# Patient Record
Sex: MALE | Race: WHITE | NOT HISPANIC OR LATINO | URBAN - METROPOLITAN AREA
[De-identification: names, ages, dates, MRNs, and addresses within clinical notes are randomized per-mention and may not be internally consistent; named-entity substitution may affect disease eponyms.]

---

## 2019-04-12 ENCOUNTER — HOSPITAL ENCOUNTER (OUTPATIENT)
Dept: SLEEP MEDICINE | Facility: HOSPITAL | Age: 44
Discharge: HOME | End: 2019-04-12
Attending: INTERNAL MEDICINE

## 2020-06-08 ENCOUNTER — OFFICE VISIT CONVERTED (OUTPATIENT)
Dept: OTOLARYNGOLOGY | Facility: CLINIC | Age: 45
End: 2020-06-08
Attending: OTOLARYNGOLOGY

## 2020-07-07 ENCOUNTER — HOSPITAL ENCOUNTER (OUTPATIENT)
Dept: PREADMISSION TESTING | Facility: HOSPITAL | Age: 45
Discharge: HOME | End: 2020-07-07
Attending: OTOLARYNGOLOGY

## 2020-07-08 LAB — SARS-COV-2 RNA SPEC QL NAA+PROBE: NOT DETECTED

## 2020-07-10 ENCOUNTER — HOSPITAL ENCOUNTER (OUTPATIENT)
Dept: PERIOP | Facility: HOSPITAL | Age: 45
Setting detail: HOSPITAL OUTPATIENT SURGERY
Discharge: HOME | End: 2020-07-10
Attending: OTOLARYNGOLOGY

## 2020-07-17 ENCOUNTER — OFFICE VISIT CONVERTED (OUTPATIENT)
Dept: OTOLARYNGOLOGY | Facility: CLINIC | Age: 45
End: 2020-07-17
Attending: OTOLARYNGOLOGY

## 2020-07-24 ENCOUNTER — HOSPITAL ENCOUNTER (OUTPATIENT)
Dept: SLEEP MEDICINE | Facility: HOSPITAL | Age: 45
Discharge: HOME | End: 2020-07-24
Attending: INTERNAL MEDICINE

## 2021-01-05 ENCOUNTER — HOSPITAL ENCOUNTER (OUTPATIENT)
Dept: GENERAL RADIOLOGY | Facility: HOSPITAL | Age: 46
Discharge: HOME | End: 2021-01-05

## 2021-03-11 ENCOUNTER — HOSPITAL ENCOUNTER (OUTPATIENT)
Dept: OTHER | Facility: HOSPITAL | Age: 46
Discharge: HOME | End: 2021-03-11
Attending: FAMILY MEDICINE

## 2021-05-10 NOTE — H&P
"   History and Physical      Patient Name: Castillo Barbour   Patient ID: [id number]   Sex: Male   YOB: 1975    Primary Care Provider: Will Zimmerman MD   Referring Provider: Will Zimmerman MD    Visit Date: June 8, 2020    Provider: Hardik Kang MD   Location: [facility]   Location Address: [address] [place] [address], [address] [place] [address]  [id number]   Location Phone: [phone]          Chief Complaint     \"I have trouble breathing through my nose.\"       History Of Present Illness  Castillo Barbour is a 44 year old /White male with past medical history significant for obstructive sleep apnea syndrome on CPAP and previous DVT/PE on Eliquis who presents to the office today as a consult from Will Zimmerman MD for evaluation of his nose. He tells me that he has had issues breathing through both sides of his nose for years. It seems to be worse in the mornings and sometimes improves throughout the day. It is occasionally quite bothersome when he has tried to use his CPAP with nasal pillows. He also reports bilateral clear rhinorrhea for which he had tried Flonase and Nasonex in the past without any provement. He is currently using ipratropium bromide nasal spray twice daily and finds this helpful. He denies any issues with epistaxis, diminished sense of smell, facial pain/pressure, previous nasal trauma, or previous nasal surgery. He believes he averages around 2-3 sinus infections annually. He has not undergone allergy testing.       Past Medical History  Closed fracture of distal end of left radius with routine healing, unspecified fracture morphology, subsequent encounter; Left wrist pain         Past Surgical History  Thrombolytic therapy for deep vein thrombosis (DVT)         Medication List  Ambien 10 mg oral tablet; Atrovent HFA 17 mcg/actuation inhalation HFA aerosol inhaler; Dexilant 60 mg oral capsule,biphase delayed releas; Eliquis 5 mg oral tablet; hydroxyzine HCl 10 " "mg oral tablet         Allergy List  erythromycin         Family Medical History  *No Known Family History         Social History  Tobacco (Never)         Review of Systems  · Constitutional  o Denies  o : fever, night sweats, weight loss  · Eyes  o Denies  o : discharge from eye, impaired vision  · HENT  o Admits  o : *See HPI  · Cardiovascular  o Denies  o : chest pain, irregular heart beats  · Respiratory  o Denies  o : shortness of breath, wheezing, coughing up blood  · Gastrointestinal  o Admits  o : heartburn, reflux  o Denies  o : vomiting blood  · Genitourinary  o Denies  o : frequency  · Integument  o Denies  o : rash, skin dryness  · Neurologic  o Denies  o : seizures, loss of balance, loss of consciousness, dizziness  · Endocrine  o Denies  o : cold intolerance, heat intolerance  · Heme-Lymph  o Denies  o : easy bleeding, anemia      Vitals  Date Time BP Position Site L\R Cuff Size HR RR TEMP (F) WT  HT  BMI kg/m2 BSA m2 O2 Sat HC       06/08/2020 11:09 AM        97.9 287lbs 8oz 6'  3\" 35.93 2.63           Physical Examination  · Constitutional  o Appearance  o : well developed, well-nourished, alert and in no acute distress, voice clear and strong  · Head and Face  o Head  o :   § Inspection  § : no deformities or lesions  o Face  o :   § Inspection  § : No facial lesions; House-Brackmann I/VI bilaterally  § Palpation  § : No TMJ crepitus nor  muscle tenderness bilaterally  · Eyes  o Vision  o :   § Visual Fields  § : Extraocular movements are intact. No spontaneous or gaze-induced nystagmus.  o Conjunctivae  o : clear  o Sclerae  o : clear  o Pupils and Irises  o : pupils equal, round, and reactive to light.   · Ears, Nose, Mouth and Throat  o Ears  o :   § External Ears  § : appearance within normal limits, no lesions present  § Otoscopic Examination  § : tympanic membrane appearance within normal limits bilaterally without perforations, well-aerated middle ears  § Hearing  § : intact to " conversational voice both ears  o Nose  o :   § External Nose  § : appearance normal  § Intranasal Exam  § : mucosa within normal limits, vestibules normal, no intranasal lesions present, septum deviated to the left, bilateral inferior turbinates hypertrophy, sinuses non tender to percussion  o Oral Cavity  o :   § Oral Mucosa  § : oral mucosa normal without pallor or cyanosis  § Lips  § : lip appearance normal  § Teeth  § : normal dentition for age  § Gums  § : gums pink, non-swollen, no bleeding present  § Tongue  § : tongue appearance normal; normal mobility  § Palate  § : hard palate normal, soft palate appearance normal with symmetric mobility  o Throat  o :   § Oropharynx  § : no inflammation or lesions present, tonsils within normal limits  § Hypopharynx  § : Deferred secondary gag reflex  § Larynx  § : Deferred secondary to gag reflex  · Neck  o Inspection/Palpation  o : normal appearance, no masses or tenderness, trachea midline; thyroid size normal, nontender, no nodules or masses present on palpation  · Respiratory  o Respiratory Effort  o : breathing unlabored  o Inspection of Chest  o : normal appearance, no retractions  · Cardiovascular  o Heart  o : regular rate and rhythm  · Lymphatic  o Neck  o : no lymphadenopathy present  o Supraclavicular Nodes  o : no lymphadenopathy present  o Preauricular Nodes  o : no lymphadenopathy present  · Skin and Subcutaneous Tissue  o General Inspection  o : Regarding face and neck - there are no rashes present, no lesions present, and no areas of discoloration  · Neurologic  o Cranial Nerves  o : cranial nerves II-XII are grossly intact bilaterally  o Gait and Station  o : normal gait, able to stand without diffculty  · Psychiatric  o Judgement and Insight  o : judgment and insight intact  o Mood and Affect  o : mood normal, affect appropriate          Assessment  · Pre-Surgical Orders     V72.84/Z01.818  · Deviated nasal septum     470/J34.2  · Nasal  obstruction     478.19/J34.89  · Hypertrophy of both inferior nasal turbinates     478.0/J34.3    Problems Reconciled  Plan  · Orders  o General Surgery Order (GENOR) - V72.84/Z01.818 - 06/08/2020  o Septoplasty with reduction of turbinates on both sides of nose (87512, 57745) - V72.84/Z01.818, 470/J34.2, 478.19/J34.89, 478.0/J34.3 - 06/08/2020  · Instructions  o PLAN:   o Handouts Provided-Pre-Procedure Instructions including date and time and location of procedure.  o ****Surgical Orders****  o ****Patient Status****  o Outpatient  o ********************  o RISK AND BENEFITS:  o Consent for surgery: Given these options, the patient has verbally expressed an understanding of the risks of surgery and finds these risks acceptable. We will proceed with surgery as soon as possible.  o Consult Anesthesia for a post-operative block, or any pain management procedure deemed necessary by the anesthesiologist for adequate post-operative pain control.   o O.R. PREP: Per protocol  o IV: Per Anesthesia  o PLEASE SIGN PERMIT FOR: Nasal septoplasty with submucous resection of the bilateral inferior nasal turbinates  o PRE- OP MEDICATION ORDER:  o *__Kefzol 2 gram IV on call to OR.  o *___The above History and Physical Examination has been completed within 30 days of admission.  o Impressions and findings were discussed Mr. Km dao. Currently, he is seen for evaluation bilateral nasal congestion which has been problematic for years. He also reports frequent clear rhinorrhea which is controlled with ipratropium bromide nasal spray and is worse in the spring. Examination today reveals a left septal deviation and hypertrophy of both inferior nasal turbinates. We discussed the pathophysiology and natural history of this condition. Options for management were discussed including continued medical management with a combination of steroid and antihistamine nasal sprays versus septoplasty with inferior turbinate reduction versus a  referral for allergy testing and immunotherapy. The risks, benefits, and alternatives were discussed. The risks include septal perforation, epistaxis, infection, recurrence of congestion, nasal scarring, altered appearance, or CSF leak. He would like to pursue septoplasty and submucous resection of the bilateral inferior nasal turbinates. He was given ample time to ask questions, all of which were answered the best my ability.  · Correspondence  o ENT Letter to Referring MD (Will Zimmerman MD) - 06/08/2020            Electronically Signed by: Hardik Kang MD -Author on June 8, 2020 11:38:23 AM

## 2021-05-13 NOTE — PROGRESS NOTES
"   Progress Note      Patient Name: Castillo Barbour   Patient ID: [id number]   Sex: Male   YOB: 1975    Primary Care Provider: Will Zimmerman MD   Referring Provider: Will Zimmerman MD    Visit Date: July 17, 2020    Provider: Hardik Kang MD   Location: [facility] Th[facility]   Location Address: [address] [place] [address], [address] [place] [address]  [id number]   Location Phone: [phone]          Chief Complaint     \"I am ready to get these out.\"       History Of Present Illness  Castillo Barbour is a 44 year old /White male who returns today for follow-up and splint removal status post nasal septoplasty with submucous resection of the bilateral inferior nasal turbinates on 7/10/2020. He tells me that he has had difficulty sleeping because of his nasal obstruction over the last week. He has not had any bleeding since second day after surgery. He does report that his nasal columella is quite sore to the touch.       Past Medical History  BPH (benign prostatic hypertrophy); Chronic sinusitis; Closed fracture of distal end of left radius with routine healing, unspecified fracture morphology, subsequent encounter; Deviated nasal septum; GERD (gastroesophageal reflux disease); Hypertrophy of both inferior nasal turbinates; Left wrist pain; Nasal obstruction; Sleep apnea         Past Surgical History  Appendectomy; Nasal septoplasty; Submucosal resection of both inferior turbinates         Medication List  Ambien 10 mg oral tablet; Atrovent HFA 17 mcg/actuation inhalation HFA aerosol inhaler; Eliquis 5 mg oral tablet; Flomax 0.4 mg oral capsule; naproxen 500 mg oral tablet         Allergy List  erythromycin         Family Medical History  Family history of heart disease         Social History  Marijuana (Former); Tobacco (Never)         Review of Systems  · Constitutional  o Denies  o : fever, night sweats, weight loss  · Eyes  o Denies  o : discharge from eye, impaired vision  · HENT  o Admits  o : " "*See HPI  · Cardiovascular  o Denies  o : chest pain, irregular heart beats  · Respiratory  o Denies  o : shortness of breath, wheezing, coughing up blood  · Gastrointestinal  o Denies  o : heartburn, reflux, vomiting blood  · Genitourinary  o Denies  o : frequency  · Integument  o Denies  o : rash, skin dryness  · Neurologic  o Denies  o : seizures, loss of balance, loss of consciousness, dizziness  · Endocrine  o Denies  o : cold intolerance, heat intolerance  · Heme-Lymph  o Denies  o : easy bleeding, anemia      Vitals  Date Time BP Position Site L\R Cuff Size HR RR TEMP (F) WT  HT  BMI kg/m2 BSA m2 O2 Sat HC       07/17/2020 01:14 PM        97.5 284lbs 8oz 6'  3\" 35.56 2.61           Physical Examination  · Constitutional  o Appearance  o : well developed, well-nourished, alert and in no acute distress, voice clear and strong  · Head and Face  o Head  o :   § Inspection  § : no deformities or lesions  o Face  o :   § Inspection  § : No facial lesions; House-Brackmann I/VI bilaterally  § Palpation  § : No TMJ crepitus nor  muscle tenderness bilaterally  · Eyes  o Vision  o :   § Visual Fields  § : Extraocular movements are intact. No spontaneous or gaze-induced nystagmus.  o Conjunctivae  o : clear  o Sclerae  o : clear  o Pupils and Irises  o : pupils equal, round, and reactive to light.   · Ears, Nose, Mouth and Throat  o Ears  o :   § External Ears  § : appearance within normal limits, no lesions present  § Otoscopic Examination  § : tympanic membrane appearance within normal limits bilaterally without perforations, well-aerated middle ears  § Hearing  § : intact to conversational voice both ears  o Nose  o :   § External Nose  § : appearance normal  § Intranasal Exam  § : Splints were removed. Mucosa slightly edematous, vestibules normal, no intranasal lesions present, septum midline, crusting to the anterior portions of the inferior turbinates bilaterally, sinuses non tender to percussion  o Oral " Cavity  o :   § Oral Mucosa  § : oral mucosa normal without pallor or cyanosis  § Lips  § : lip appearance normal  § Teeth  § : normal dentition for age  § Gums  § : gums pink, non-swollen, no bleeding present  § Tongue  § : tongue appearance normal; normal mobility  § Palate  § : hard palate normal, soft palate appearance normal with symmetric mobility  o Throat  o :   § Oropharynx  § : no inflammation or lesions present, tonsils within normal limits  · Neck  o Inspection/Palpation  o : normal appearance, no masses or tenderness, trachea midline; thyroid size normal, nontender, no nodules or masses present on palpation  · Respiratory  o Respiratory Effort  o : breathing unlabored  · Lymphatic  o Neck  o : no lymphadenopathy present  o Supraclavicular Nodes  o : no lymphadenopathy present  o Preauricular Nodes  o : no lymphadenopathy present  · Skin and Subcutaneous Tissue  o General Inspection  o : Regarding face and neck - there are no rashes present, no lesions present, and no areas of discoloration  · Neurologic  o Cranial Nerves  o : cranial nerves II-XII are grossly intact bilaterally  o Gait and Station  o : normal gait, able to stand without diffculty  · Psychiatric  o Judgement and Insight  o : judgment and insight intact  o Mood and Affect  o : mood normal, affect appropriate          Assessment  · Deviated nasal septum     470/J34.2  · Nasal obstruction     478.19/J34.89  · Hypertrophy of both inferior nasal turbinates     478.0/J34.3    Problems Reconciled  Plan  · Medications  o Medications have been Reconciled  o Transition of Care or Provider Policy  · Instructions  o Impressions and findings were discussed Mr. Barbour at great length. Currently, he is healing well and we discussed continued postoperative care. He will follow-up in 6 to 8 weeks or sooner if needed.            Electronically Signed by: Hardik Kang MD -Author on July 17, 2020 01:36:56 PM

## 2021-05-15 VITALS — BODY MASS INDEX: 35.37 KG/M2 | HEIGHT: 75 IN | WEIGHT: 284.5 LBS | TEMPERATURE: 97.5 F

## 2021-05-15 VITALS — BODY MASS INDEX: 35.75 KG/M2 | WEIGHT: 287.5 LBS | HEIGHT: 75 IN | TEMPERATURE: 97.9 F

## 2023-10-02 ENCOUNTER — OFFICE VISIT (OUTPATIENT)
Dept: PODIATRY | Facility: CLINIC | Age: 48
End: 2023-10-02
Payer: COMMERCIAL

## 2023-10-02 VITALS
TEMPERATURE: 97.3 F | WEIGHT: 222 LBS | OXYGEN SATURATION: 96 % | HEIGHT: 75 IN | SYSTOLIC BLOOD PRESSURE: 113 MMHG | HEART RATE: 58 BPM | DIASTOLIC BLOOD PRESSURE: 70 MMHG | BODY MASS INDEX: 27.6 KG/M2

## 2023-10-02 DIAGNOSIS — M79.671 RIGHT FOOT PAIN: Primary | ICD-10-CM

## 2023-10-02 DIAGNOSIS — B07.0 PLANTAR WARTS: ICD-10-CM

## 2023-10-02 PROCEDURE — 17110 DESTRUCTION B9 LES UP TO 14: CPT | Performed by: PODIATRIST

## 2023-10-02 PROCEDURE — 99202 OFFICE O/P NEW SF 15 MIN: CPT | Performed by: PODIATRIST

## 2023-10-02 RX ORDER — RIVAROXABAN 15 MG/1
TABLET, FILM COATED ORAL
COMMUNITY
Start: 2023-09-29

## 2023-10-02 RX ORDER — ZOLPIDEM TARTRATE 10 MG/1
10 TABLET ORAL
COMMUNITY

## 2023-10-02 RX ORDER — IPRATROPIUM BROMIDE 17 UG/1
AEROSOL, METERED RESPIRATORY (INHALATION)
COMMUNITY

## 2023-10-02 RX ORDER — NAPROXEN 500 MG/1
TABLET ORAL
COMMUNITY

## 2023-10-02 RX ORDER — TAMSULOSIN HYDROCHLORIDE 0.4 MG/1
0.4 CAPSULE ORAL DAILY
COMMUNITY

## 2023-10-02 RX ORDER — CYCLOBENZAPRINE HCL 10 MG
10 TABLET ORAL
COMMUNITY

## 2023-10-02 RX ORDER — METOPROLOL SUCCINATE 25 MG/1
25 TABLET, EXTENDED RELEASE ORAL DAILY
COMMUNITY

## 2023-10-03 NOTE — PROGRESS NOTES
[facility] - PODIATRY    Today's Date: 10/03/23    Patient Name: Castillo Barbour  MRN: [id number]  CSN: [id number]  PCP: Will Zimmerman MD,   Referring Provider: Will Zimmerman MD    SUBJECTIVE     Chief Complaint   Patient presents with    Right Foot - Plantar Warts, Pain     Has been present for years GP excised one last week  states referred here to see if anything else needed to be done and to look at the rest     HPI: Castillo Barbour, a 48 y.o.male, presents to clinic.    Patient is a 48-year-old male presenting with painful right foot lesions.  They have gone for several months.  Has had one of them removed but it has come back.  He is here for further treatment.  Past Medical History:   Diagnosis Date    DVT (deep venous thrombosis)     Enlarged prostate     Hypertension     Pulmonary emboli      Past Surgical History:   Procedure Laterality Date    APPENDECTOMY      GASTRIC BANDING      SINUS SURGERY       History reviewed. No pertinent family history.  Social History     Socioeconomic History    Marital status:    Substance and Sexual Activity    Alcohol use: Yes    Drug use: Yes     Types: Marijuana     Comment: THC gummies    Sexual activity: Defer     Allergies   Allergen Reactions    Erythromycin Rash     Current Outpatient Medications   Medication Sig Dispense Refill    cyclobenzaprine (FLEXERIL) 10 MG tablet Take 1 tablet by mouth.      ipratropium (Atrovent HFA) 17 MCG/ACT inhaler Atrovent HFA 17 mcg/actuation inhalation HFA aerosol inhaler inhale 2 puffs (34 mcg) by inhalation route 4 times per day   Active      metoprolol succinate XL (TOPROL-XL) 25 MG 24 hr tablet Take 1 tablet by mouth Daily.      naproxen (NAPROSYN) 500 MG tablet naproxen 500 mg oral tablet take 1 tablet by oral route daily   Active      tamsulosin (FLOMAX) 0.4 MG capsule 24 hr capsule Take 1 capsule by mouth Daily.      Xarelto 15 MG tablet       zolpidem (AMBIEN) 10 MG tablet Take 1 tablet by mouth.        No current facility-administered medications for this visit.     Review of Systems    OBJECTIVE     Vitals:    10/02/23 1520   BP: 113/70   Pulse: 58   Temp: 97.3 °F (36.3 °C)   SpO2: 96%       WBC   Date Value Ref Range Status   06/16/2021 8.81 4.5 - 11.0 10*3/uL Final     RBC   Date Value Ref Range Status   06/16/2021 4.89 4.5 - 5.9 10*6/uL Final     Hemoglobin   Date Value Ref Range Status   06/16/2021 14.5 13.5 - 17.5 g/dL Final     Hematocrit   Date Value Ref Range Status   06/16/2021 42.0 41.0 - 53.0 % Final     MCV   Date Value Ref Range Status   06/16/2021 85.9 80.0 - 100.0 fL Final     MCH   Date Value Ref Range Status   06/16/2021 29.7 26.0 - 34.0 pg Final     MCHC   Date Value Ref Range Status   06/16/2021 34.5 31.0 - 37.0 g/dL Final     RDW   Date Value Ref Range Status   06/16/2021 12.5 12.0 - 16.8 % Final     RDW-SD   Date Value Ref Range Status   07/20/2020 38.0 35.1 - 43.9 fL Final     MPV   Date Value Ref Range Status   06/16/2021 9.8 8.4 - 12.4 fL Final     Platelets   Date Value Ref Range Status   06/16/2021 172 140 - 440 10*3/uL Final     Neutrophil Rel %   Date Value Ref Range Status   06/16/2021 82.0 (H) 45 - 80 % Final     Lymphocyte Rel %   Date Value Ref Range Status   06/16/2021 12.1 (L) 15 - 50 % Final     Monocyte Rel %   Date Value Ref Range Status   06/16/2021 5.4 0 - 15 % Final     Eosinophil %   Date Value Ref Range Status   06/16/2021 0.0 0 - 7 % Final     Basophil Rel %   Date Value Ref Range Status   06/16/2021 0.3 0 - 2 % Final     Immature Grans %   Date Value Ref Range Status   06/16/2021 0.2 0.0 - 1.0 % Final     Neutrophils Absolute   Date Value Ref Range Status   06/16/2021 7.21 2.0 - 8.8 10*3/uL Final     Lymphocytes Absolute   Date Value Ref Range Status   06/16/2021 1.07 0.7 - 5.5 10*3/uL Final     Monocytes Absolute   Date Value Ref Range Status   06/16/2021 0.48 0.0 - 1.7 10*3/uL Final     Eosinophils Absolute   Date Value Ref Range Status   06/16/2021 0.00 0.0 - 0.8  10*3/uL Final     Basophils Absolute   Date Value Ref Range Status   06/16/2021 0.03 0.0 - 0.2 10*3/uL Final     Immature Grans, Absolute   Date Value Ref Range Status   06/16/2021 0.02 0.00 - 0.10 10*3/uL Final     nRBC   Date Value Ref Range Status   06/16/2021 0 0 /100(WBC) Final         Lab Results   Component Value Date    GLUCOSE 109 (H) 07/20/2020    BUN 18 07/20/2020    CREATININE 1.11 07/20/2020    BCR 16 07/20/2020    K 3.8 07/20/2020    CO2 25 07/20/2020    CALCIUM 9.3 07/20/2020    ALBUMIN 4.4 07/20/2020    LABIL2 1.5 07/20/2020    AST 21 07/20/2020    ALT 22 07/20/2020       Patient seen in no apparent distress.      PHYSICAL EXAM:     Foot/Ankle Exam    GENERAL  Appearance:  appears stated age  Orientation:  AAOx3  Affect:  appropriate  Gait:  unimpaired  Assistance:  independent  Right shoe gear: casual shoe  Left shoe gear: casual shoe    VASCULAR     Right Foot Vascularity   Normal vascular exam    Dorsalis pedis:  2+  Posterior tibial:  2+  Skin temperature:  warm  Edema grading:  None  CFT:  < 3 seconds  Pedal hair growth:  Present  Varicosities:  none     Left Foot Vascularity   Normal vascular exam    Dorsalis pedis:  2+  Posterior tibial:  2+  Skin temperature:  warm  Edema grading:  None  CFT:  < 3 seconds  Pedal hair growth:  Present  Varicosities:  none     NEUROLOGIC     Right Foot Neurologic   Normal sensation    Light touch sensation: normal  Vibratory sensation: normal  Hot/Cold sensation: normal  Protective Sensation using [place] Monofilament:   Sites intact: 10  Sites tested: 10     Left Foot Neurologic   Normal sensation    Light touch sensation: normal  Vibratory sensation: normal  Hot/Cold sensation:  normal  Protective Sensation using [place] Monofilament:   Sites intact: 10  Sites tested: 10    MUSCLE STRENGTH     Right Foot Muscle Strength   Foot dorsiflexion:  4  Foot plantar flexion:  4  Foot inversion:  4  Foot eversion:  4     Left Foot Muscle Strength    Foot dorsiflexion:  4  Foot plantar flexion:  4  Foot inversion:  4  Foot eversion:  4    RANGE OF MOTION     Right Foot Range of Motion   Foot and ankle ROM within normal limits       Left Foot Range of Motion   Foot and ankle ROM within normal limits      DERMATOLOGIC      Right Foot Dermatologic   Skin  Right foot skin is intact.      Left Foot Dermatologic   Skin  Left foot skin is intact.      Right foot additional comments: 3 lesions to plantar right foot with pain on lateral compression and pinpoint bleeding lesions measuring 0.4 x 0.4 cm lesions.      RADIOLOGY:          No results found.    ASSESSMENT/PLAN     Diagnoses and all orders for this visit:    1. Right foot pain (Primary)    2. Plantar warts      Verruca lesion areas were debrided with #15 scalpel blade down to pinpoint bleeding.  These areas are treated with applications of 89% phenol.  This was followed by irrigation with copious amounts of isopropyl alcohol.    Comprehensive lower extremity examination and evaluation was performed.    Discussed findings and treatment plan including risks, benefits, and treatment options with patient in detail. Patient agreed with treatment plan.    Medications and allergies reviewed.  Reviewed available lab values along with other pertinent labs.  These were discussed with the patient.    An After Visit Summary was printed and given to the patient at discharge, including (if requested) any available informative/educational handouts regarding diagnosis, treatment, or medications. All questions were answered to patient/family satisfaction. Should symptoms fail to improve or worsen they agree to call or return to clinic or to go to the Emergency Department. Discussed the importance of following up with any needed screening tests/labs/specialist appointments and any requested follow-up recommended by me today. Importance of maintaining follow-up discussed and patient accepts that missed appointments can delay  diagnosis and potentially lead to worsening of conditions.    Return in about 1 month (around 11/2/2023)., or sooner if acute issues arise.    This document has been electronically signed by Shar Puentes DPM on October 3, 2023 08:55 EDT

## 2023-11-03 ENCOUNTER — OFFICE VISIT (OUTPATIENT)
Dept: PODIATRY | Facility: CLINIC | Age: 48
End: 2023-11-03
Payer: COMMERCIAL

## 2023-11-03 VITALS
DIASTOLIC BLOOD PRESSURE: 82 MMHG | TEMPERATURE: 98.3 F | OXYGEN SATURATION: 97 % | HEART RATE: 60 BPM | BODY MASS INDEX: 27.87 KG/M2 | WEIGHT: 223 LBS | SYSTOLIC BLOOD PRESSURE: 134 MMHG

## 2023-11-03 DIAGNOSIS — B07.0 PLANTAR WARTS: Primary | ICD-10-CM

## 2023-11-03 DIAGNOSIS — M79.671 RIGHT FOOT PAIN: ICD-10-CM

## 2023-11-03 PROCEDURE — 17110 DESTRUCTION B9 LES UP TO 14: CPT | Performed by: PODIATRIST

## 2023-11-03 RX ORDER — IPRATROPIUM BROMIDE 42 UG/1
SPRAY, METERED NASAL
COMMUNITY
Start: 2023-11-02

## 2023-11-06 NOTE — PROGRESS NOTES
[facility] - PODIATRY    Today's Date: 11/06/23    Patient Name: Castillo Barbour  MRN: [id number]  CSN: [id number]  PCP: Will Zimmerman MD,   Referring Provider: No ref. provider found    SUBJECTIVE     Chief Complaint   Patient presents with    Right Foot - Follow-up     S/p removal of a plantar wart     HPI: Castillo Barbour, a 48 y.o.male, presents to clinic.    Patient is a 48-year-old male presenting with painful right foot lesions.  They have gone for several months.  Has had one of them removed but it has come back.  He is here for further treatment.    11/3/2023-patient is here for follow-up.  Warts are smaller.  Past Medical History:   Diagnosis Date    DVT (deep venous thrombosis)     Enlarged prostate     Hypertension     Pulmonary emboli      Past Surgical History:   Procedure Laterality Date    APPENDECTOMY      GASTRIC BANDING      SINUS SURGERY       History reviewed. No pertinent family history.  Social History     Socioeconomic History    Marital status:    Tobacco Use    Smoking status: Never   Substance and Sexual Activity    Alcohol use: Yes    Drug use: Yes     Types: Marijuana     Comment: THC gummies    Sexual activity: Defer     Allergies   Allergen Reactions    Erythromycin Rash     Current Outpatient Medications   Medication Sig Dispense Refill    cyclobenzaprine (FLEXERIL) 10 MG tablet Take 1 tablet by mouth.      ipratropium (Atrovent HFA) 17 MCG/ACT inhaler Atrovent HFA 17 mcg/actuation inhalation HFA aerosol inhaler inhale 2 puffs (34 mcg) by inhalation route 4 times per day   Active      ipratropium (ATROVENT) 0.06 % nasal spray       metoprolol succinate XL (TOPROL-XL) 25 MG 24 hr tablet Take 1 tablet by mouth Daily.      naproxen (NAPROSYN) 500 MG tablet naproxen 500 mg oral tablet take 1 tablet by oral route daily   Active      tamsulosin (FLOMAX) 0.4 MG capsule 24 hr capsule Take 1 capsule by mouth Daily.      Xarelto 15 MG tablet       zolpidem (AMBIEN) 10 MG  tablet Take 1 tablet by mouth.       No current facility-administered medications for this visit.     Review of Systems    OBJECTIVE     Vitals:    11/03/23 1514   BP: 134/82   Pulse: 60   Temp: 98.3 °F (36.8 °C)   SpO2: 97%       WBC   Date Value Ref Range Status   06/16/2021 8.81 4.5 - 11.0 10*3/uL Final     RBC   Date Value Ref Range Status   06/16/2021 4.89 4.5 - 5.9 10*6/uL Final     Hemoglobin   Date Value Ref Range Status   06/16/2021 14.5 13.5 - 17.5 g/dL Final     Hematocrit   Date Value Ref Range Status   06/16/2021 42.0 41.0 - 53.0 % Final     MCV   Date Value Ref Range Status   06/16/2021 85.9 80.0 - 100.0 fL Final     MCH   Date Value Ref Range Status   06/16/2021 29.7 26.0 - 34.0 pg Final     MCHC   Date Value Ref Range Status   06/16/2021 34.5 31.0 - 37.0 g/dL Final     RDW   Date Value Ref Range Status   06/16/2021 12.5 12.0 - 16.8 % Final     RDW-SD   Date Value Ref Range Status   07/20/2020 38.0 35.1 - 43.9 fL Final     MPV   Date Value Ref Range Status   06/16/2021 9.8 8.4 - 12.4 fL Final     Platelets   Date Value Ref Range Status   06/16/2021 172 140 - 440 10*3/uL Final     Neutrophil Rel %   Date Value Ref Range Status   06/16/2021 82.0 (H) 45 - 80 % Final     Lymphocyte Rel %   Date Value Ref Range Status   06/16/2021 12.1 (L) 15 - 50 % Final     Monocyte Rel %   Date Value Ref Range Status   06/16/2021 5.4 0 - 15 % Final     Eosinophil %   Date Value Ref Range Status   06/16/2021 0.0 0 - 7 % Final     Basophil Rel %   Date Value Ref Range Status   06/16/2021 0.3 0 - 2 % Final     Immature Grans %   Date Value Ref Range Status   06/16/2021 0.2 0.0 - 1.0 % Final     Neutrophils Absolute   Date Value Ref Range Status   06/16/2021 7.21 2.0 - 8.8 10*3/uL Final     Lymphocytes Absolute   Date Value Ref Range Status   06/16/2021 1.07 0.7 - 5.5 10*3/uL Final     Monocytes Absolute   Date Value Ref Range Status   06/16/2021 0.48 0.0 - 1.7 10*3/uL Final     Eosinophils Absolute   Date Value Ref Range  Status   06/16/2021 0.00 0.0 - 0.8 10*3/uL Final     Basophils Absolute   Date Value Ref Range Status   06/16/2021 0.03 0.0 - 0.2 10*3/uL Final     Immature Grans, Absolute   Date Value Ref Range Status   06/16/2021 0.02 0.00 - 0.10 10*3/uL Final     nRBC   Date Value Ref Range Status   06/16/2021 0 0 /100(WBC) Final         Lab Results   Component Value Date    GLUCOSE 109 (H) 07/20/2020    BUN 18 07/20/2020    CREATININE 1.11 07/20/2020    BCR 16 07/20/2020    K 3.8 07/20/2020    CO2 25 07/20/2020    CALCIUM 9.3 07/20/2020    ALBUMIN 4.4 07/20/2020    LABIL2 1.5 07/20/2020    AST 21 07/20/2020    ALT 22 07/20/2020       Patient seen in no apparent distress.      PHYSICAL EXAM:     Foot/Ankle Exam    GENERAL  Appearance:  appears stated age  Orientation:  AAOx3  Affect:  appropriate  Gait:  unimpaired  Assistance:  independent  Right shoe gear: casual shoe  Left shoe gear: casual shoe    VASCULAR     Right Foot Vascularity   Normal vascular exam    Dorsalis pedis:  2+  Posterior tibial:  2+  Skin temperature:  warm  Edema grading:  None  CFT:  < 3 seconds  Pedal hair growth:  Present  Varicosities:  none     Left Foot Vascularity   Normal vascular exam    Dorsalis pedis:  2+  Posterior tibial:  2+  Skin temperature:  warm  Edema grading:  None  CFT:  < 3 seconds  Pedal hair growth:  Present  Varicosities:  none     NEUROLOGIC     Right Foot Neurologic   Normal sensation    Light touch sensation: normal  Vibratory sensation: normal  Hot/Cold sensation: normal  Protective Sensation using [place]Papa Monofilament:   Sites intact: 10  Sites tested: 10     Left Foot Neurologic   Normal sensation    Light touch sensation: normal  Vibratory sensation: normal  Hot/Cold sensation:  normal  Protective Sensation using Lit[place]-Papa Monofilament:   Sites intact: 10  Sites tested: 10    MUSCLE STRENGTH     Right Foot Muscle Strength   Foot dorsiflexion:  4  Foot plantar flexion:  4  Foot inversion:  4  Foot eversion:   4     Left Foot Muscle Strength   Foot dorsiflexion:  4  Foot plantar flexion:  4  Foot inversion:  4  Foot eversion:  4    RANGE OF MOTION     Right Foot Range of Motion   Foot and ankle ROM within normal limits       Left Foot Range of Motion   Foot and ankle ROM within normal limits      DERMATOLOGIC      Right Foot Dermatologic   Skin  Right foot skin is intact.      Left Foot Dermatologic   Skin  Left foot skin is intact.      Right foot additional comments: 3 lesions to plantar right foot with pain on lateral compression and pinpoint bleeding lesions measuring 0.4 x 0.4 cm lesions.        RADIOLOGY:          No results found.    ASSESSMENT/PLAN     Diagnoses and all orders for this visit:    1. Plantar warts (Primary)    2. Right foot pain      Verruca lesion areas were debrided with #15 scalpel blade down to pinpoint bleeding.  These areas are treated with applications of 89% phenol.  This was followed by irrigation with copious amounts of isopropyl alcohol.    Comprehensive lower extremity examination and evaluation was performed.    Discussed findings and treatment plan including risks, benefits, and treatment options with patient in detail. Patient agreed with treatment plan.    Medications and allergies reviewed.  Reviewed available lab values along with other pertinent labs.  These were discussed with the patient.    An After Visit Summary was printed and given to the patient at discharge, including (if requested) any available informative/educational handouts regarding diagnosis, treatment, or medications. All questions were answered to patient/family satisfaction. Should symptoms fail to improve or worsen they agree to call or return to clinic or to go to the Emergency Department. Discussed the importance of following up with any needed screening tests/labs/specialist appointments and any requested follow-up recommended by me today. Importance of maintaining follow-up discussed and patient accepts that  missed appointments can delay diagnosis and potentially lead to worsening of conditions.    Return in about 1 month (around 12/3/2023)., or sooner if acute issues arise.    This document has been electronically signed by Shar Puentes DPM on November 6, 2023 07:33 EST

## 2023-12-01 ENCOUNTER — OFFICE VISIT (OUTPATIENT)
Dept: PODIATRY | Facility: CLINIC | Age: 48
End: 2023-12-01
Payer: COMMERCIAL

## 2023-12-01 VITALS
TEMPERATURE: 97.5 F | DIASTOLIC BLOOD PRESSURE: 77 MMHG | OXYGEN SATURATION: 97 % | WEIGHT: 223 LBS | HEART RATE: 65 BPM | BODY MASS INDEX: 27.73 KG/M2 | HEIGHT: 75 IN | SYSTOLIC BLOOD PRESSURE: 129 MMHG

## 2023-12-01 DIAGNOSIS — M79.671 RIGHT FOOT PAIN: ICD-10-CM

## 2023-12-01 DIAGNOSIS — B07.0 PLANTAR WARTS: Primary | ICD-10-CM

## 2023-12-01 PROCEDURE — 17110 DESTRUCTION B9 LES UP TO 14: CPT | Performed by: PODIATRIST

## 2023-12-04 NOTE — PROGRESS NOTES
[facility] - PODIATRY    Today's Date: 12/04/23    Patient Name: Castillo Barbour  MRN: [id number]  CSN: [id number]  PCP: Will Zimmerman MD,   Referring Provider: No ref. provider found    SUBJECTIVE     Chief Complaint   Patient presents with    Right Foot - Follow-up, Plantar Warts     HPI: Castillo Brabour, a 48 y.o.male, presents to clinic.    Patient is a 48-year-old male presenting with painful right foot lesions.  They have gone for several months.  Has had one of them removed but it has come back.  He is here for further treatment.    12/1/2023-patient states warts are getting smaller.  Past Medical History:   Diagnosis Date    DVT (deep venous thrombosis)     Enlarged prostate     Hypertension     Pulmonary emboli     Verruca      Past Surgical History:   Procedure Laterality Date    APPENDECTOMY      GASTRIC BANDING      SINUS SURGERY       History reviewed. No pertinent family history.  Social History     Socioeconomic History    Marital status:    Tobacco Use    Smoking status: Never   Vaping Use    Vaping Use: Never used   Substance and Sexual Activity    Alcohol use: Yes    Drug use: Yes     Types: Marijuana     Comment: THC gummies    Sexual activity: Defer     Allergies   Allergen Reactions    Erythromycin Rash     Current Outpatient Medications   Medication Sig Dispense Refill    cyclobenzaprine (FLEXERIL) 10 MG tablet Take 1 tablet by mouth.      ipratropium (Atrovent HFA) 17 MCG/ACT inhaler Atrovent HFA 17 mcg/actuation inhalation HFA aerosol inhaler inhale 2 puffs (34 mcg) by inhalation route 4 times per day   Active      ipratropium (ATROVENT) 0.06 % nasal spray       metoprolol succinate XL (TOPROL-XL) 25 MG 24 hr tablet Take 1 tablet by mouth Daily.      naproxen (NAPROSYN) 500 MG tablet naproxen 500 mg oral tablet take 1 tablet by oral route daily   Active      tamsulosin (FLOMAX) 0.4 MG capsule 24 hr capsule Take 1 capsule by mouth Daily.      Xarelto 15 MG tablet        zolpidem (AMBIEN) 10 MG tablet Take 1 tablet by mouth.       No current facility-administered medications for this visit.     Review of Systems    OBJECTIVE     Vitals:    12/01/23 1507   BP: 129/77   Pulse: 65   Temp: 97.5 °F (36.4 °C)   SpO2: 97%       WBC   Date Value Ref Range Status   06/16/2021 8.81 4.5 - 11.0 10*3/uL Final     RBC   Date Value Ref Range Status   06/16/2021 4.89 4.5 - 5.9 10*6/uL Final     Hemoglobin   Date Value Ref Range Status   06/16/2021 14.5 13.5 - 17.5 g/dL Final     Hematocrit   Date Value Ref Range Status   06/16/2021 42.0 41.0 - 53.0 % Final     MCV   Date Value Ref Range Status   06/16/2021 85.9 80.0 - 100.0 fL Final     MCH   Date Value Ref Range Status   06/16/2021 29.7 26.0 - 34.0 pg Final     MCHC   Date Value Ref Range Status   06/16/2021 34.5 31.0 - 37.0 g/dL Final     RDW   Date Value Ref Range Status   06/16/2021 12.5 12.0 - 16.8 % Final     RDW-SD   Date Value Ref Range Status   07/20/2020 38.0 35.1 - 43.9 fL Final     MPV   Date Value Ref Range Status   06/16/2021 9.8 8.4 - 12.4 fL Final     Platelets   Date Value Ref Range Status   06/16/2021 172 140 - 440 10*3/uL Final     Neutrophil Rel %   Date Value Ref Range Status   06/16/2021 82.0 (H) 45 - 80 % Final     Lymphocyte Rel %   Date Value Ref Range Status   06/16/2021 12.1 (L) 15 - 50 % Final     Monocyte Rel %   Date Value Ref Range Status   06/16/2021 5.4 0 - 15 % Final     Eosinophil %   Date Value Ref Range Status   06/16/2021 0.0 0 - 7 % Final     Basophil Rel %   Date Value Ref Range Status   06/16/2021 0.3 0 - 2 % Final     Immature Grans %   Date Value Ref Range Status   06/16/2021 0.2 0.0 - 1.0 % Final     Neutrophils Absolute   Date Value Ref Range Status   06/16/2021 7.21 2.0 - 8.8 10*3/uL Final     Lymphocytes Absolute   Date Value Ref Range Status   06/16/2021 1.07 0.7 - 5.5 10*3/uL Final     Monocytes Absolute   Date Value Ref Range Status   06/16/2021 0.48 0.0 - 1.7 10*3/uL Final     Eosinophils Absolute    Date Value Ref Range Status   06/16/2021 0.00 0.0 - 0.8 10*3/uL Final     Basophils Absolute   Date Value Ref Range Status   06/16/2021 0.03 0.0 - 0.2 10*3/uL Final     Immature Grans, Absolute   Date Value Ref Range Status   06/16/2021 0.02 0.00 - 0.10 10*3/uL Final     nRBC   Date Value Ref Range Status   06/16/2021 0 0 /100(WBC) Final         Lab Results   Component Value Date    GLUCOSE 109 (H) 07/20/2020    BUN 14 06/16/2021    CREATININE 1.03 06/16/2021    EGFRIFAFRI >60 06/16/2021    BCR 13.6 06/16/2021    K 4.9 06/16/2021    CO2 23 06/16/2021    CALCIUM 9.2 06/16/2021    ALBUMIN 4.4 07/20/2020    LABIL2 1.5 07/20/2020    AST 21 07/20/2020    ALT 22 07/20/2020       Patient seen in no apparent distress.      PHYSICAL EXAM:     Foot/Ankle Exam    GENERAL  Appearance:  appears stated age  Orientation:  AAOx3  Affect:  appropriate  Gait:  unimpaired  Assistance:  independent  Right shoe gear: casual shoe  Left shoe gear: casual shoe    VASCULAR     Right Foot Vascularity   Normal vascular exam    Dorsalis pedis:  2+  Posterior tibial:  2+  Skin temperature:  warm  Edema grading:  None  CFT:  < 3 seconds  Pedal hair growth:  Present  Varicosities:  none     Left Foot Vascularity   Normal vascular exam    Dorsalis pedis:  2+  Posterior tibial:  2+  Skin temperature:  warm  Edema grading:  None  CFT:  < 3 seconds  Pedal hair growth:  Present  Varicosities:  none     NEUROLOGIC     Right Foot Neurologic   Normal sensation    Light touch sensation: normal  Vibratory sensation: normal  Hot/Cold sensation: normal  Protective Sensation using [place]-Papa Monofilament:   Sites intact: 10  Sites tested: 10     Left Foot Neurologic   Normal sensation    Light touch sensation: normal  Vibratory sensation: normal  Hot/Cold sensation:  normal  Protective Sensation using Bison-Papa Monofilament:   Sites intact: 10  Sites tested: 10    MUSCLE STRENGTH     Right Foot Muscle Strength   Foot dorsiflexion:  4  Foot  plantar flexion:  4  Foot inversion:  4  Foot eversion:  4     Left Foot Muscle Strength   Foot dorsiflexion:  4  Foot plantar flexion:  4  Foot inversion:  4  Foot eversion:  4    RANGE OF MOTION     Right Foot Range of Motion   Foot and ankle ROM within normal limits       Left Foot Range of Motion   Foot and ankle ROM within normal limits      DERMATOLOGIC      Right Foot Dermatologic   Skin  Right foot skin is intact.      Left Foot Dermatologic   Skin  Left foot skin is intact.      Right foot additional comments: 3 lesions to plantar right foot with pain on lateral compression and pinpoint bleeding lesions measuring 0.3 x 0.3 cm lesions.        RADIOLOGY:          No results found.    ASSESSMENT/PLAN     Diagnoses and all orders for this visit:    1. Plantar warts (Primary)    2. Right foot pain      Verruca lesion areas were debrided with #15 scalpel blade down to pinpoint bleeding.  These areas are treated with applications of 89% phenol.  This was followed by irrigation with copious amounts of isopropyl alcohol.    Comprehensive lower extremity examination and evaluation was performed.    Discussed findings and treatment plan including risks, benefits, and treatment options with patient in detail. Patient agreed with treatment plan.    Medications and allergies reviewed.  Reviewed available lab values along with other pertinent labs.  These were discussed with the patient.    An After Visit Summary was printed and given to the patient at discharge, including (if requested) any available informative/educational handouts regarding diagnosis, treatment, or medications. All questions were answered to patient/family satisfaction. Should symptoms fail to improve or worsen they agree to call or return to clinic or to go to the Emergency Department. Discussed the importance of following up with any needed screening tests/labs/specialist appointments and any requested follow-up recommended by me today. Importance of  maintaining follow-up discussed and patient accepts that missed appointments can delay diagnosis and potentially lead to worsening of conditions.    Return in about 1 month (around 1/1/2024)., or sooner if acute issues arise.    This document has been electronically signed by Shar Puentes DPM on December 4, 2023 07:43 EST

## 2023-12-08 ENCOUNTER — OFFICE VISIT (OUTPATIENT)
Dept: SLEEP MEDICINE | Facility: HOSPITAL | Age: 48
End: 2023-12-08
Payer: COMMERCIAL

## 2023-12-08 VITALS
DIASTOLIC BLOOD PRESSURE: 81 MMHG | HEART RATE: 71 BPM | WEIGHT: 225.2 LBS | HEIGHT: 75 IN | BODY MASS INDEX: 28 KG/M2 | OXYGEN SATURATION: 96 % | SYSTOLIC BLOOD PRESSURE: 135 MMHG

## 2023-12-08 DIAGNOSIS — G47.33 OSA (OBSTRUCTIVE SLEEP APNEA): Primary | ICD-10-CM

## 2023-12-08 PROCEDURE — G0463 HOSPITAL OUTPT CLINIC VISIT: HCPCS

## 2023-12-08 NOTE — PROGRESS NOTES
[facility]      Patient Care Team:  Will Zimmerman MD as PCP - General (Family Medicine)    Referring Provider: Will Zimmerman MD    Chief complaint:   Establish care for sleep apnea    History of present illness:    Subjective   This is a 48 year old male patient, with history of HTN and PE.       He was last seen in this office in July 2020.  He lost follow-up since then.    ELVI:    HST 2016: ARCHIE= 10/h; Carlos SpO2= 87%.    Patient has been on CPAP therapy since then. He has the original CPAP which was issues in 2016. He said that the humidifier lid is not latching well.    Original symptoms consist of snoring, apnea, restless sleep and EDS.  Most of his symptoms improved with PAP therapy.       DME: [facility].  Mask: NP       He reported using his CPAP regularly.  He denies any issues with air leak or pressure intolerance.        Sleep schedule:  -Bedtime: 10 PM  -Sleep latency: 30 to 60 minutes  -Wake up time: 6 AM, does feel refreshed  -Nocturnal awakening: None    Mask: FFM which does fit well.  No significant air leak or dry mouth  DME: [facility]    ESS: Total score: 2     Review of Systems  Constitutional: No fever or chills. No changes in appetite.   ENMT: Nasal congestion.  No postnasal drip, hoarsness.  Sore in mouth.  Cardiovascular: No chest pain, palpitation but legs swelling.    Respiratory: No dyspnea, cough or wheezing.  Gastrointestinal: No constipation, diarrhea, abdominal pain or acid reflux  Neurology: No headache, weakness, numbness or dizziness.   Musculoskeletal: No joints pain, stiffness or swelling.   Psychiatry: No depression, anxiety or irritability.   Hem/Lymphatic: No swollen glands or easy bruising.  Integumentary: No rash.  Endocrinology: No excessive thirst, cold or warm intolerance.   Urinary: No dysuria, bloody urine or frequent urination.     History  Past Medical History:   Diagnosis Date    DVT (deep venous  "thrombosis)     Enlarged prostate     Hypertension     Pulmonary emboli     Verruca    ,   Past Surgical History:   Procedure Laterality Date    APPENDECTOMY      GASTRIC BANDING      SINUS SURGERY     , History reviewed. No pertinent family history.,   Social History     Socioeconomic History    Marital status:    Tobacco Use    Smoking status: Never    Smokeless tobacco: Never   Vaping Use    Vaping Use: Never used   Substance and Sexual Activity    Alcohol use: Yes     Comment: occ    Drug use: Yes     Types: Marijuana     Comment: THC gummies    Sexual activity: Defer     E-cigarette/Vaping    E-cigarette/Vaping Use Never User      E-cigarette/Vaping Substances    Nicotine No     THC No     CBD No     Flavoring No      E-cigarette/Vaping Devices    Disposable No     Pre-filled or Refillable Cartridge No     Refillable Tank No     Pre-filled Pod No          , and Allergies:  Erythromycin    Medications:    Current Outpatient Medications:     cyclobenzaprine (FLEXERIL) 10 MG tablet, Take 1 tablet by mouth., Disp: , Rfl:     ipratropium (Atrovent HFA) 17 MCG/ACT inhaler, Atrovent HFA 17 mcg/actuation inhalation HFA aerosol inhaler inhale 2 puffs (34 mcg) by inhalation route 4 times per day   Active, Disp: , Rfl:     ipratropium (ATROVENT) 0.06 % nasal spray, , Disp: , Rfl:     metoprolol succinate XL (TOPROL-XL) 25 MG 24 hr tablet, Take 1 tablet by mouth Daily., Disp: , Rfl:     naproxen (NAPROSYN) 500 MG tablet, naproxen 500 mg oral tablet take 1 tablet by oral route daily   Active, Disp: , Rfl:     tamsulosin (FLOMAX) 0.4 MG capsule 24 hr capsule, Take 1 capsule by mouth Daily., Disp: , Rfl:     Xarelto 15 MG tablet, , Disp: , Rfl:     zolpidem (AMBIEN) 10 MG tablet, Take 1 tablet by mouth., Disp: , Rfl:       Objective   Vital Signs:  Vitals:    12/08/23 1500   BP: 135/81   Pulse: 71   SpO2: 96%   Weight: 102 kg (225 lb 3.2 oz)   Height: 190.5 cm (75\")     Body mass index is 28.15 kg/m².  Neck " Circumference: 14.5 inches     Physical Exam:  Neck Circumference: 14.5 inches     Constitutional: Not in acute distress.  Eyes: Injected conjunctiva, EOMI. pupils equal reactive to light.  ENMT: Trevino score 3. Mallampati score 3. No oral thrush. Tonsils grade 1. Narrow distance in between the posterior pharyngeal pillars.  Scalloped tongue.  Neck:  No thyromegaly.  Trachea midline.  Heart: Regular rhythm and rate, no murmur  Lungs: Good and equal air entry bilaterally. No crackles or wheezing.  Nonlabored breathing.       Abdomen: Obese.  Soft.  No tenderness.  Positive bowel sounds.  Extremities: No cyanosis, clubbing or pitting edema.  Warm extremities and well-perfused.  Neuro: Conscious, alert, oriented x3.  Gait is normal.  Strength 5/5 in arms and legs.  Psych: Appropriate mood and affect.    Integumentary: No rash.  Normal skin turgor.  Lymphatic: No palpable cervical or supraclavicular lymph nodes.    Diagnostic data:    CPAP download showed:  Date: Last 30 days  Usage (days): 100 %  Days used>4h: 96 %  AHI: 3.4/h  Leak: 0 min and 22 seconds  Usage: 6h and 54 min  P 90% : 7.5  Auto CPAP: 4-20 cm H2O      Hemoglobin   Date Value Ref Range Status   06/16/2021 14.5 13.5 - 17.5 g/dL Final     Total CO2   Date Value Ref Range Status   06/16/2021 23 22 - 29 mmol/L Final        Assessment   ELVI  Overweight, BMI 28  HTN      Plan:  Discussed the result of the download.   Patient is compliant with therapy and clinically benefit from treatment.  Patient was encouraged to continue using PAP.  Refill supplies. Will order him a new CPAP since his machine is too old and not working properly.      Counseled for weight loss.  Encouraged to exercise regularly and cut down on carbohydrates.  Discussed that losing weight may decrease the severity of sleep apnea and obviate the need of CPAP therapy.    Discussed the association between obstructive sleep apnea and cardiovascular disease including HTN and the beneficial  effect of Pap therapy in reducing the risk of major cardiovascular events.          Hector Rodas MD  12/08/23  16:25 EST    This note was dictated utilizing Dragon dictation

## 2024-01-12 ENCOUNTER — OFFICE VISIT (OUTPATIENT)
Dept: PODIATRY | Facility: CLINIC | Age: 49
End: 2024-01-12
Payer: COMMERCIAL

## 2024-01-12 VITALS
WEIGHT: 226 LBS | BODY MASS INDEX: 28.25 KG/M2 | TEMPERATURE: 98.3 F | OXYGEN SATURATION: 97 % | HEART RATE: 54 BPM | DIASTOLIC BLOOD PRESSURE: 84 MMHG | SYSTOLIC BLOOD PRESSURE: 129 MMHG

## 2024-01-12 DIAGNOSIS — M79.671 RIGHT FOOT PAIN: ICD-10-CM

## 2024-01-12 DIAGNOSIS — B07.0 PLANTAR WARTS: Primary | ICD-10-CM

## 2024-01-18 NOTE — PROGRESS NOTES
[facility]    Today's Date: 01/18/24    Patient Name: Castillo Barbour  MRN: [id number]  CSN: [id number]  PCP: Will Zimmerman MD,   Referring Provider: No ref. provider found    SUBJECTIVE     Chief Complaint   Patient presents with    Right Foot - Follow-up, Plantar Warts     HPI: Castillo Barbour, a 48 y.o.male, presents to clinic.    Patient is a 48-year-old male presenting with painful right foot lesions.  They have gone for several months.  Has had one of them removed but it has come back.  He is here for further treatment.    Patient states he feels his warts are getting bigger and more of them   Past Medical History:   Diagnosis Date    DVT (deep venous thrombosis)     Enlarged prostate     Hypertension     Pulmonary emboli     Verruca      Past Surgical History:   Procedure Laterality Date    APPENDECTOMY      GASTRIC BANDING      SINUS SURGERY       History reviewed. No pertinent family history.  Social History     Socioeconomic History    Marital status:    Tobacco Use    Smoking status: Never    Smokeless tobacco: Never   Vaping Use    Vaping Use: Never used   Substance and Sexual Activity    Alcohol use: Yes     Comment: occ    Drug use: Yes     Types: Marijuana     Comment: THC gummies    Sexual activity: Defer     Allergies   Allergen Reactions    Erythromycin Rash     Current Outpatient Medications   Medication Sig Dispense Refill    cyclobenzaprine (FLEXERIL) 10 MG tablet Take 1 tablet by mouth.      ipratropium (Atrovent HFA) 17 MCG/ACT inhaler Atrovent HFA 17 mcg/actuation inhalation HFA aerosol inhaler inhale 2 puffs (34 mcg) by inhalation route 4 times per day   Active      ipratropium (ATROVENT) 0.06 % nasal spray       metoprolol succinate XL (TOPROL-XL) 25 MG 24 hr tablet Take 1 tablet by mouth Daily.      naproxen (NAPROSYN) 500 MG tablet naproxen 500 mg oral tablet take 1 tablet by oral route daily   Active      tamsulosin (FLOMAX) 0.4 MG capsule 24 hr capsule Take  1 capsule by mouth Daily.      Xarelto 15 MG tablet       zolpidem (AMBIEN) 10 MG tablet Take 1 tablet by mouth.       No current facility-administered medications for this visit.     Review of Systems    OBJECTIVE     Vitals:    01/12/24 1507   BP: 129/84   Pulse: 54   Temp: 98.3 °F (36.8 °C)   SpO2: 97%       WBC   Date Value Ref Range Status   06/16/2021 8.81 4.5 - 11.0 10*3/uL Final     RBC   Date Value Ref Range Status   06/16/2021 4.89 4.5 - 5.9 10*6/uL Final     Hemoglobin   Date Value Ref Range Status   06/16/2021 14.5 13.5 - 17.5 g/dL Final     Hematocrit   Date Value Ref Range Status   06/16/2021 42.0 41.0 - 53.0 % Final     MCV   Date Value Ref Range Status   06/16/2021 85.9 80.0 - 100.0 fL Final     MCH   Date Value Ref Range Status   06/16/2021 29.7 26.0 - 34.0 pg Final     MCHC   Date Value Ref Range Status   06/16/2021 34.5 31.0 - 37.0 g/dL Final     RDW   Date Value Ref Range Status   06/16/2021 12.5 12.0 - 16.8 % Final     RDW-SD   Date Value Ref Range Status   07/20/2020 38.0 35.1 - 43.9 fL Final     MPV   Date Value Ref Range Status   06/16/2021 9.8 8.4 - 12.4 fL Final     Platelets   Date Value Ref Range Status   06/16/2021 172 140 - 440 10*3/uL Final     Neutrophil Rel %   Date Value Ref Range Status   06/16/2021 82.0 (H) 45 - 80 % Final     Lymphocyte Rel %   Date Value Ref Range Status   06/16/2021 12.1 (L) 15 - 50 % Final     Monocyte Rel %   Date Value Ref Range Status   06/16/2021 5.4 0 - 15 % Final     Eosinophil %   Date Value Ref Range Status   06/16/2021 0.0 0 - 7 % Final     Basophil Rel %   Date Value Ref Range Status   06/16/2021 0.3 0 - 2 % Final     Immature Grans %   Date Value Ref Range Status   06/16/2021 0.2 0.0 - 1.0 % Final     Neutrophils Absolute   Date Value Ref Range Status   06/16/2021 7.21 2.0 - 8.8 10*3/uL Final     Lymphocytes Absolute   Date Value Ref Range Status   06/16/2021 1.07 0.7 - 5.5 10*3/uL Final     Monocytes Absolute   Date Value Ref Range Status    06/16/2021 0.48 0.0 - 1.7 10*3/uL Final     Eosinophils Absolute   Date Value Ref Range Status   06/16/2021 0.00 0.0 - 0.8 10*3/uL Final     Basophils Absolute   Date Value Ref Range Status   06/16/2021 0.03 0.0 - 0.2 10*3/uL Final     Immature Grans, Absolute   Date Value Ref Range Status   06/16/2021 0.02 0.00 - 0.10 10*3/uL Final     nRBC   Date Value Ref Range Status   06/16/2021 0 0 /100(WBC) Final         Lab Results   Component Value Date    GLUCOSE 109 (H) 07/20/2020    BUN 14 06/16/2021    CREATININE 1.03 06/16/2021    EGFRIFAFRI >60 06/16/2021    BCR 13.6 06/16/2021    K 4.9 06/16/2021    CO2 23 06/16/2021    CALCIUM 9.2 06/16/2021    ALBUMIN 4.4 07/20/2020    LABIL2 1.5 07/20/2020    AST 21 07/20/2020    ALT 22 07/20/2020       Patient seen in no apparent distress.      PHYSICAL EXAM:     Foot/Ankle Exam    GENERAL  Appearance:  appears stated age  Orientation:  AAOx3  Affect:  appropriate  Gait:  unimpaired  Assistance:  independent  Right shoe gear: casual shoe  Left shoe gear: casual shoe    VASCULAR     Right Foot Vascularity   Normal vascular exam    Dorsalis pedis:  2+  Posterior tibial:  2+  Skin temperature:  warm  Edema grading:  None  CFT:  < 3 seconds  Pedal hair growth:  Present  Varicosities:  none     Left Foot Vascularity   Normal vascular exam    Dorsalis pedis:  2+  Posterior tibial:  2+  Skin temperature:  warm  Edema grading:  None  CFT:  < 3 seconds  Pedal hair growth:  Present  Varicosities:  none     NEUROLOGIC     Right Foot Neurologic   Normal sensation    Light touch sensation: normal  Vibratory sensation: normal  Hot/Cold sensation: normal  Protective Sensation using [place] Monofilament:   Sites intact: 10  Sites tested: 10     Left Foot Neurologic   Normal sensation    Light touch sensation: normal  Vibratory sensation: normal  Hot/Cold sensation:  normal  Protective Sensation using [place] Monofilament:   Sites intact: 10  Sites tested: 10    MUSCLE  STRENGTH     Right Foot Muscle Strength   Foot dorsiflexion:  4  Foot plantar flexion:  4  Foot inversion:  4  Foot eversion:  4     Left Foot Muscle Strength   Foot dorsiflexion:  4  Foot plantar flexion:  4  Foot inversion:  4  Foot eversion:  4    RANGE OF MOTION     Right Foot Range of Motion   Foot and ankle ROM within normal limits       Left Foot Range of Motion   Foot and ankle ROM within normal limits      DERMATOLOGIC      Right Foot Dermatologic   Skin  Right foot skin is intact.      Left Foot Dermatologic   Skin  Left foot skin is intact.      Right foot additional comments: 3 lesions to plantar right foot with pain on lateral compression and pinpoint bleeding lesions measuring 0.3 x 0.3 cm lesions.        RADIOLOGY:          No results found.    ASSESSMENT/PLAN     Diagnoses and all orders for this visit:    1. Plantar warts (Primary)    2. Right foot pain        Verruca lesion areas were debrided with #15 scalpel blade down to pinpoint bleeding.  These areas are treated with applications of 89% phenol.  This was followed by irrigation with copious amounts of isopropyl alcohol.    Discussed possibly trying cimetidine for treatment of the warts as they seem to have stalled out.      Comprehensive lower extremity examination and evaluation was performed.    Discussed findings and treatment plan including risks, benefits, and treatment options with patient in detail. Patient agreed with treatment plan.    Medications and allergies reviewed.  Reviewed available lab values along with other pertinent labs.  These were discussed with the patient.    An After Visit Summary was printed and given to the patient at discharge, including (if requested) any available informative/educational handouts regarding diagnosis, treatment, or medications. All questions were answered to patient/family satisfaction. Should symptoms fail to improve or worsen they agree to call or return to clinic or to go to the Emergency  Department. Discussed the importance of following up with any needed screening tests/labs/specialist appointments and any requested follow-up recommended by me today. Importance of maintaining follow-up discussed and patient accepts that missed appointments can delay diagnosis and potentially lead to worsening of conditions.    No follow-ups on file., or sooner if acute issues arise.    This document has been electronically signed by Shar Puentes DPM on January 18, 2024 12:56 EST

## 2024-01-23 ENCOUNTER — TELEPHONE (OUTPATIENT)
Dept: PODIATRY | Facility: CLINIC | Age: 49
End: 2024-01-23
Payer: COMMERCIAL

## 2024-01-24 ENCOUNTER — TELEPHONE (OUTPATIENT)
Dept: PODIATRY | Facility: CLINIC | Age: 49
End: 2024-01-24
Payer: COMMERCIAL

## 2024-01-24 NOTE — TELEPHONE ENCOUNTER
LVM, Dr Puentes needs to know who his surgeon was that did his gastric sleeve so he can speak with him about medication

## 2024-02-12 ENCOUNTER — OFFICE VISIT (OUTPATIENT)
Dept: PODIATRY | Facility: CLINIC | Age: 49
End: 2024-02-12
Payer: COMMERCIAL

## 2024-02-12 VITALS
OXYGEN SATURATION: 96 % | WEIGHT: 226 LBS | SYSTOLIC BLOOD PRESSURE: 114 MMHG | BODY MASS INDEX: 28.1 KG/M2 | HEART RATE: 70 BPM | TEMPERATURE: 98 F | HEIGHT: 75 IN | DIASTOLIC BLOOD PRESSURE: 66 MMHG

## 2024-02-12 DIAGNOSIS — M79.671 RIGHT FOOT PAIN: ICD-10-CM

## 2024-02-12 DIAGNOSIS — B07.0 PLANTAR WARTS: Primary | ICD-10-CM

## 2024-03-22 ENCOUNTER — OFFICE VISIT (OUTPATIENT)
Dept: SLEEP MEDICINE | Facility: HOSPITAL | Age: 49
End: 2024-03-22
Payer: COMMERCIAL

## 2024-03-22 VITALS
HEART RATE: 59 BPM | BODY MASS INDEX: 27.35 KG/M2 | DIASTOLIC BLOOD PRESSURE: 73 MMHG | WEIGHT: 220 LBS | HEIGHT: 75 IN | SYSTOLIC BLOOD PRESSURE: 127 MMHG | OXYGEN SATURATION: 100 %

## 2024-03-22 DIAGNOSIS — G47.33 OSA (OBSTRUCTIVE SLEEP APNEA): Primary | ICD-10-CM

## 2024-03-22 PROCEDURE — G0463 HOSPITAL OUTPT CLINIC VISIT: HCPCS

## 2024-03-22 NOTE — PROGRESS NOTES
[facility]                          Chief Complaint:   F/up ELVI    History of present illness:   Subjective       This is a 48 year old male patient, with history of HTN and PE.       ELVI:   Original symptoms consist of snoring, apnea, restless sleep and EDS.  Most of his symptoms improved with PAP therapy.     HST 2016: ARCHIE= 10/h; Carlos SpO2= 87%.    Patient has been on CPAP therapy since then. He received a new CPAP 2024.           DME: [facility].  Mask: NP      Sleep schedule:  -Bedtime: 10:30  -Wake up time: 6 AM , does feel refreshed  -Nocturnal awakenin-3 times because of change position.  No difficulties going back to sleep.      ESS: Total score: 4     HTN: On metoprolol.  BP is controlled.  He takes Xarelto for history of DVT.    REVIEW OF SYSTEMS:   HEENT: Postnasal drip.  Dry mouth.  CARDIOVASCULAR: No chest pain, chest pressure or chest discomfort. No palpitations or edema.   RESPIRATORY: No shortness of breath, cough or sputum.   GASTROINTESTINAL: No abdominal bloating or reflux   NEUROLOGICAL/PSYCHOATRY: No depression nor anxiety    Past Medical History:  Past Medical History:   Diagnosis Date    DVT (deep venous thrombosis)     Enlarged prostate     Hypertension     Pulmonary emboli     Verruca    ,   Past Surgical History:   Procedure Laterality Date    APPENDECTOMY      GASTRIC BANDING      SINUS SURGERY     ,   Social History     Socioeconomic History    Marital status:    Tobacco Use    Smoking status: Never    Smokeless tobacco: Never   Vaping Use    Vaping status: Never Used   Substance and Sexual Activity    Alcohol use: Yes     Comment: occ    Drug use: Yes     Types: Marijuana     Comment: THC gummies    Sexual activity: Defer     E-cigarette/Vaping    E-cigarette/Vaping Use Never User      E-cigarette/Vaping Substances    Nicotine No     THC No     CBD No     Flavoring No      E-cigarette/Vaping Devices    Disposable No     Pre-filled or  "Refillable Cartridge No     Refillable Tank No     Pre-filled Pod No          , and Allergies:  Erythromycin    Medication Review:     Current Outpatient Medications:     cyclobenzaprine (FLEXERIL) 10 MG tablet, Take 1 tablet by mouth., Disp: , Rfl:     ipratropium (ATROVENT) 0.06 % nasal spray, , Disp: , Rfl:     metoprolol succinate XL (TOPROL-XL) 25 MG 24 hr tablet, Take 1 tablet by mouth Daily., Disp: , Rfl:     naproxen (NAPROSYN) 500 MG tablet, naproxen 500 mg oral tablet take 1 tablet by oral route daily   Active, Disp: , Rfl:     tamsulosin (FLOMAX) 0.4 MG capsule 24 hr capsule, Take 1 capsule by mouth Daily., Disp: , Rfl:     Xarelto 15 MG tablet, , Disp: , Rfl:     zolpidem (AMBIEN) 10 MG tablet, Take 1 tablet by mouth., Disp: , Rfl:     ipratropium (Atrovent HFA) 17 MCG/ACT inhaler, Atrovent HFA 17 mcg/actuation inhalation HFA aerosol inhaler inhale 2 puffs (34 mcg) by inhalation route 4 times per day   Active (Patient not taking: Reported on 3/22/2024), Disp: , Rfl:       Objective   Vital Signs:  Vitals:    03/22/24 1300   BP: 127/73   Pulse: 59   SpO2: 100%   Weight: 99.8 kg (220 lb)   Height: 190.5 cm (75\")     Body mass index is 27.5 kg/m².          Physical Exam:   General Appearance:    Alert, cooperative, in no acute distress   ENMT:  Trevino score 3. Mallampati score 3. No oral thrush. Tonsils grade 1. Narrow distance in between the posterior pharyngeal pillars.  Scalloped tongue.    Neck:   Trachea midline. No thyromegaly.   Lungs:     Clear to auscultation,respirations regular, even and  unlabored    Heart:    Regular rhythm and normal rate, normal S1 and S2, no Murmur.   Abdomen:     Obese.  Soft.  No tenderness.  No HSM    Neuro:   Conscious, alert, oriented x3. Appropriate mood and affect.    Extremities:   Moves all extremities well, no edema, no cyanosis, no Redness              Diagnostic data:      CPAP download showed:  Date: Last 30 days  Usage (days): 97 %  Days used>4h: 97 %  AHI: " "1.4/h  Leak: 19  Usage: 6h and 52 min  P 90% : 8  Auto CPAP: 5-15 cm H2O    No results found for: \"HGBA1C\"  No results found for: \"CHOL\", \"TRIG\", \"HDL\"  Hemoglobin   Date Value Ref Range Status   06/16/2021 14.5 13.5 - 17.5 g/dL Final     Total CO2   Date Value Ref Range Status   06/16/2021 23 22 - 29 mmol/L Final        Assessment   ELVI  HTN  Overweight, BMI 27        PLAN:      Discussed the result of the download.   Patient is compliant with therapy and clinically benefit from treatment.  Patient was encouraged to continue using PAP.  Refill supplies.    Order a chinstrap due to dryness and air leak.    Discussed the association between obstructive sleep apnea and cardiovascular disease including HTN and the beneficial effect of Pap therapy in reducing the risk of major cardiovascular events.    Counseled for weight loss.              This note was dictated utilizing Dragon dictation  "

## 2024-04-04 ENCOUNTER — OFFICE VISIT (OUTPATIENT)
Dept: PODIATRY | Facility: CLINIC | Age: 49
End: 2024-04-04
Payer: COMMERCIAL

## 2024-04-04 VITALS
DIASTOLIC BLOOD PRESSURE: 87 MMHG | SYSTOLIC BLOOD PRESSURE: 146 MMHG | TEMPERATURE: 96.9 F | OXYGEN SATURATION: 95 % | WEIGHT: 218 LBS | HEART RATE: 56 BPM | BODY MASS INDEX: 27.1 KG/M2 | HEIGHT: 75 IN

## 2024-04-04 DIAGNOSIS — B07.0 PLANTAR WARTS: ICD-10-CM

## 2024-04-04 DIAGNOSIS — M79.671 RIGHT FOOT PAIN: Primary | ICD-10-CM

## 2024-04-04 NOTE — PROGRESS NOTES
[facility] - PODIATRY    Today's Date: 04/04/24    Patient Name: Castillo Barbour  MRN: [id number]  CSN: [id number]  PCP: Will Zimmerman MD,   Referring Provider: No ref. provider found    SUBJECTIVE     Chief Complaint   Patient presents with    Right Foot - Follow-up, Plantar Warts     HPI: Castillo Barbour, a 48 y.o.male, presents to clinic.    Patient is a 48-year-old male presenting with painful right foot lesions.  They have gone for several months.  Has had one of them removed but it has come back.  He is here for further treatment.    Patient states he feels his warts are improving  Past Medical History:   Diagnosis Date    DVT (deep venous thrombosis)     Enlarged prostate     Hypertension     Pulmonary emboli     Verruca      Past Surgical History:   Procedure Laterality Date    APPENDECTOMY      GASTRIC BANDING      SINUS SURGERY       History reviewed. No pertinent family history.  Social History     Socioeconomic History    Marital status:    Tobacco Use    Smoking status: Never    Smokeless tobacco: Never   Vaping Use    Vaping status: Never Used   Substance and Sexual Activity    Alcohol use: Yes     Comment: occ    Drug use: Yes     Types: Marijuana     Comment: THC gummies    Sexual activity: Defer     Allergies   Allergen Reactions    Erythromycin Rash     Current Outpatient Medications   Medication Sig Dispense Refill    cyclobenzaprine (FLEXERIL) 10 MG tablet Take 1 tablet by mouth.      ipratropium (Atrovent HFA) 17 MCG/ACT inhaler       ipratropium (ATROVENT) 0.06 % nasal spray       metoprolol succinate XL (TOPROL-XL) 25 MG 24 hr tablet Take 1 tablet by mouth Daily.      naproxen (NAPROSYN) 500 MG tablet naproxen 500 mg oral tablet take 1 tablet by oral route daily   Active      tamsulosin (FLOMAX) 0.4 MG capsule 24 hr capsule Take 1 capsule by mouth Daily.      Xarelto 15 MG tablet       zolpidem (AMBIEN) 10 MG tablet Take 1 tablet by mouth.       No current  facility-administered medications for this visit.     Review of Systems    OBJECTIVE     Vitals:    04/04/24 1058   BP: 146/87   Pulse: 56   Temp: 96.9 °F (36.1 °C)   SpO2: 95%       WBC   Date Value Ref Range Status   06/16/2021 8.81 4.5 - 11.0 10*3/uL Final     RBC   Date Value Ref Range Status   06/16/2021 4.89 4.5 - 5.9 10*6/uL Final     Hemoglobin   Date Value Ref Range Status   06/16/2021 14.5 13.5 - 17.5 g/dL Final     Hematocrit   Date Value Ref Range Status   06/16/2021 42.0 41.0 - 53.0 % Final     MCV   Date Value Ref Range Status   06/16/2021 85.9 80.0 - 100.0 fL Final     MCH   Date Value Ref Range Status   06/16/2021 29.7 26.0 - 34.0 pg Final     MCHC   Date Value Ref Range Status   06/16/2021 34.5 31.0 - 37.0 g/dL Final     RDW   Date Value Ref Range Status   06/16/2021 12.5 12.0 - 16.8 % Final     RDW-SD   Date Value Ref Range Status   07/20/2020 38.0 35.1 - 43.9 fL Final     MPV   Date Value Ref Range Status   06/16/2021 9.8 8.4 - 12.4 fL Final     Platelets   Date Value Ref Range Status   06/16/2021 172 140 - 440 10*3/uL Final     Neutrophil Rel %   Date Value Ref Range Status   06/16/2021 82.0 (H) 45 - 80 % Final     Lymphocyte Rel %   Date Value Ref Range Status   06/16/2021 12.1 (L) 15 - 50 % Final     Monocyte Rel %   Date Value Ref Range Status   06/16/2021 5.4 0 - 15 % Final     Eosinophil %   Date Value Ref Range Status   06/16/2021 0.0 0 - 7 % Final     Basophil Rel %   Date Value Ref Range Status   06/16/2021 0.3 0 - 2 % Final     Immature Grans %   Date Value Ref Range Status   06/16/2021 0.2 0.0 - 1.0 % Final     Neutrophils Absolute   Date Value Ref Range Status   06/16/2021 7.21 2.0 - 8.8 10*3/uL Final     Lymphocytes Absolute   Date Value Ref Range Status   06/16/2021 1.07 0.7 - 5.5 10*3/uL Final     Monocytes Absolute   Date Value Ref Range Status   06/16/2021 0.48 0.0 - 1.7 10*3/uL Final     Eosinophils Absolute   Date Value Ref Range Status   06/16/2021 0.00 0.0 - 0.8 10*3/uL Final      Basophils Absolute   Date Value Ref Range Status   06/16/2021 0.03 0.0 - 0.2 10*3/uL Final     Immature Grans, Absolute   Date Value Ref Range Status   06/16/2021 0.02 0.00 - 0.10 10*3/uL Final     nRBC   Date Value Ref Range Status   06/16/2021 0 0 /100(WBC) Final         Lab Results   Component Value Date    GLUCOSE 109 (H) 07/20/2020    BUN 14 06/16/2021    CREATININE 1.03 06/16/2021    EGFRIFAFRI >60 06/16/2021    BCR 13.6 06/16/2021    K 4.9 06/16/2021    CO2 23 06/16/2021    CALCIUM 9.2 06/16/2021    ALBUMIN 4.4 07/20/2020    LABIL2 1.5 07/20/2020    AST 21 07/20/2020    ALT 22 07/20/2020       Patient seen in no apparent distress.      PHYSICAL EXAM:     Foot/Ankle Exam    GENERAL  Appearance:  appears stated age  Orientation:  AAOx3  Affect:  appropriate  Gait:  unimpaired  Assistance:  independent  Right shoe gear: casual shoe  Left shoe gear: casual shoe    VASCULAR     Right Foot Vascularity   Normal vascular exam    Dorsalis pedis:  2+  Posterior tibial:  2+  Skin temperature:  warm  Edema grading:  None  CFT:  < 3 seconds  Pedal hair growth:  Present  Varicosities:  none     Left Foot Vascularity   Normal vascular exam    Dorsalis pedis:  2+  Posterior tibial:  2+  Skin temperature:  warm  Edema grading:  None  CFT:  < 3 seconds  Pedal hair growth:  Present  Varicosities:  none     NEUROLOGIC     Right Foot Neurologic   Normal sensation    Light touch sensation: normal  Vibratory sensation: normal  Hot/Cold sensation: normal  Protective Sensation using [place] Monofilament:   Sites intact: 10  Sites tested: 10     Left Foot Neurologic   Normal sensation    Light touch sensation: normal  Vibratory sensation: normal  Hot/Cold sensation:  normal  Protective Sensation using [place] Monofilament:   Sites intact: 10  Sites tested: 10    MUSCLE STRENGTH     Right Foot Muscle Strength   Foot dorsiflexion:  4  Foot plantar flexion:  4  Foot inversion:  4  Foot eversion:  4     Left Foot  Muscle Strength   Foot dorsiflexion:  4  Foot plantar flexion:  4  Foot inversion:  4  Foot eversion:  4    RANGE OF MOTION     Right Foot Range of Motion   Foot and ankle ROM within normal limits       Left Foot Range of Motion   Foot and ankle ROM within normal limits      DERMATOLOGIC      Right Foot Dermatologic   Skin  Right foot skin is intact.      Left Foot Dermatologic   Skin  Left foot skin is intact.      Right foot additional comments: 3 lesions to plantar right foot with pain on lateral compression and pinpoint bleeding lesions measuring 0.3 x 0.3 cm lesions.        RADIOLOGY:          No results found.    ASSESSMENT/PLAN     There are no diagnoses linked to this encounter.        Verruca lesion areas were debrided with #15 scalpel blade down to pinpoint bleeding.  These areas are treated with applications of 89% phenol.  This was followed by irrigation with copious amounts of isopropyl alcohol.    Comprehensive lower extremity examination and evaluation was performed.    Discussed findings and treatment plan including risks, benefits, and treatment options with patient in detail. Patient agreed with treatment plan.    Medications and allergies reviewed.  Reviewed available lab values along with other pertinent labs.  These were discussed with the patient.    An After Visit Summary was printed and given to the patient at discharge, including (if requested) any available informative/educational handouts regarding diagnosis, treatment, or medications. All questions were answered to patient/family satisfaction. Should symptoms fail to improve or worsen they agree to call or return to clinic or to go to the Emergency Department. Discussed the importance of following up with any needed screening tests/labs/specialist appointments and any requested follow-up recommended by me today. Importance of maintaining follow-up discussed and patient accepts that missed appointments can delay diagnosis and potentially  lead to worsening of conditions.    No follow-ups on file., or sooner if acute issues arise.    This document has been electronically signed by Shar Puentes DPM on April 4, 2024 12:22 EDT

## 2024-07-19 ENCOUNTER — OFFICE VISIT (OUTPATIENT)
Dept: PODIATRY | Facility: CLINIC | Age: 49
End: 2024-07-19
Payer: COMMERCIAL

## 2024-07-19 DIAGNOSIS — M79.671 RIGHT FOOT PAIN: ICD-10-CM

## 2024-07-19 DIAGNOSIS — B07.0 PLANTAR WARTS: Primary | ICD-10-CM

## 2024-07-19 PROCEDURE — 17110 DESTRUCTION B9 LES UP TO 14: CPT | Performed by: PODIATRIST

## 2024-07-22 VITALS
DIASTOLIC BLOOD PRESSURE: 73 MMHG | TEMPERATURE: 97.1 F | SYSTOLIC BLOOD PRESSURE: 120 MMHG | HEART RATE: 70 BPM | WEIGHT: 218 LBS | BODY MASS INDEX: 27.25 KG/M2 | OXYGEN SATURATION: 98 %

## 2024-07-22 NOTE — PROGRESS NOTES
[facility] - PODIATRY    Today's Date: 07/22/24    Patient Name: Castillo Barbour  MRN: [id number]  CSN: [id number]  PCP: Will Zimmerman MD,   Referring Provider: No ref. provider found    SUBJECTIVE     Chief Complaint   Patient presents with    Right Foot - Follow-up     warts     HPI: Castillo Barbour, a 48 y.o.male, presents to clinic.    Patient is a 48-year-old male presenting with painful right foot lesions.  They have gone for several months.  Has had one of them removed but it has come back.  He is here for further treatment.    Patient states he feels his warts are improving.  Past Medical History:   Diagnosis Date    DVT (deep venous thrombosis)     Enlarged prostate     Hypertension     Pulmonary emboli     Verruca      Past Surgical History:   Procedure Laterality Date    APPENDECTOMY      GASTRIC BANDING      SINUS SURGERY       History reviewed. No pertinent family history.  Social History     Socioeconomic History    Marital status:    Tobacco Use    Smoking status: Never    Smokeless tobacco: Never   Vaping Use    Vaping status: Never Used   Substance and Sexual Activity    Alcohol use: Yes     Comment: occ    Drug use: Yes     Types: Marijuana     Comment: THC gummies    Sexual activity: Defer     Allergies   Allergen Reactions    Erythromycin Rash     Current Outpatient Medications   Medication Sig Dispense Refill    cyclobenzaprine (FLEXERIL) 10 MG tablet Take 1 tablet by mouth.      ipratropium (Atrovent HFA) 17 MCG/ACT inhaler       ipratropium (ATROVENT) 0.06 % nasal spray       metoprolol succinate XL (TOPROL-XL) 25 MG 24 hr tablet Take 1 tablet by mouth Daily.      naproxen (NAPROSYN) 500 MG tablet naproxen 500 mg oral tablet take 1 tablet by oral route daily   Active      tamsulosin (FLOMAX) 0.4 MG capsule 24 hr capsule Take 1 capsule by mouth Daily.      Xarelto 15 MG tablet       zolpidem (AMBIEN) 10 MG tablet Take 1 tablet by mouth.       No current facility-administered  medications for this visit.     Review of Systems    OBJECTIVE     Vitals:    07/22/24 1231   BP: 120/73   Pulse: 70   Temp: 97.1 °F (36.2 °C)   SpO2: 98%       WBC   Date Value Ref Range Status   06/16/2021 8.81 4.5 - 11.0 10*3/uL Final     RBC   Date Value Ref Range Status   06/16/2021 4.89 4.5 - 5.9 10*6/uL Final     Hemoglobin   Date Value Ref Range Status   06/16/2021 14.5 13.5 - 17.5 g/dL Final     Hematocrit   Date Value Ref Range Status   06/16/2021 42.0 41.0 - 53.0 % Final     MCV   Date Value Ref Range Status   06/16/2021 85.9 80.0 - 100.0 fL Final     MCH   Date Value Ref Range Status   06/16/2021 29.7 26.0 - 34.0 pg Final     MCHC   Date Value Ref Range Status   06/16/2021 34.5 31.0 - 37.0 g/dL Final     RDW   Date Value Ref Range Status   06/16/2021 12.5 12.0 - 16.8 % Final     RDW-SD   Date Value Ref Range Status   07/20/2020 38.0 35.1 - 43.9 fL Final     MPV   Date Value Ref Range Status   06/16/2021 9.8 8.4 - 12.4 fL Final     Platelets   Date Value Ref Range Status   06/16/2021 172 140 - 440 10*3/uL Final     Neutrophil Rel %   Date Value Ref Range Status   06/16/2021 82.0 (H) 45 - 80 % Final     Lymphocyte Rel %   Date Value Ref Range Status   06/16/2021 12.1 (L) 15 - 50 % Final     Monocyte Rel %   Date Value Ref Range Status   06/16/2021 5.4 0 - 15 % Final     Eosinophil %   Date Value Ref Range Status   06/16/2021 0.0 0 - 7 % Final     Basophil Rel %   Date Value Ref Range Status   06/16/2021 0.3 0 - 2 % Final     Immature Grans %   Date Value Ref Range Status   06/16/2021 0.2 0.0 - 1.0 % Final     Neutrophils Absolute   Date Value Ref Range Status   06/16/2021 7.21 2.0 - 8.8 10*3/uL Final     Lymphocytes Absolute   Date Value Ref Range Status   06/16/2021 1.07 0.7 - 5.5 10*3/uL Final     Monocytes Absolute   Date Value Ref Range Status   06/16/2021 0.48 0.0 - 1.7 10*3/uL Final     Eosinophils Absolute   Date Value Ref Range Status   06/16/2021 0.00 0.0 - 0.8 10*3/uL Final     Basophils Absolute    Date Value Ref Range Status   06/16/2021 0.03 0.0 - 0.2 10*3/uL Final     Immature Grans, Absolute   Date Value Ref Range Status   06/16/2021 0.02 0.00 - 0.10 10*3/uL Final     nRBC   Date Value Ref Range Status   06/16/2021 0 0 /100(WBC) Final         Lab Results   Component Value Date    GLUCOSE 109 (H) 07/20/2020    BUN 14 06/16/2021    CREATININE 1.03 06/16/2021    EGFRIFAFRI >60 06/16/2021    BCR 13.6 06/16/2021    K 4.9 06/16/2021    CO2 23 06/16/2021    CALCIUM 9.2 06/16/2021    ALBUMIN 4.4 07/20/2020    LABIL2 1.5 07/20/2020    AST 21 07/20/2020    ALT 22 07/20/2020       Patient seen in no apparent distress.      PHYSICAL EXAM:     Foot/Ankle Exam    GENERAL  Appearance:  appears stated age  Orientation:  AAOx3  Affect:  appropriate  Gait:  unimpaired  Assistance:  independent  Right shoe gear: casual shoe  Left shoe gear: casual shoe    VASCULAR     Right Foot Vascularity   Normal vascular exam    Dorsalis pedis:  2+  Posterior tibial:  2+  Skin temperature:  warm  Edema grading:  None  CFT:  < 3 seconds  Pedal hair growth:  Present  Varicosities:  none     Left Foot Vascularity   Normal vascular exam    Dorsalis pedis:  2+  Posterior tibial:  2+  Skin temperature:  warm  Edema grading:  None  CFT:  < 3 seconds  Pedal hair growth:  Present  Varicosities:  none     NEUROLOGIC     Right Foot Neurologic   Normal sensation    Light touch sensation: normal  Vibratory sensation: normal  Hot/Cold sensation: normal  Protective Sensation using Hewett-Papa Monofilament:   Sites intact: 10  Sites tested: 10     Left Foot Neurologic   Normal sensation    Light touch sensation: normal  Vibratory sensation: normal  Hot/Cold sensation:  normal  Protective Sensation using Hewett-Papa Monofilament:   Sites intact: 10  Sites tested: 10    MUSCLE STRENGTH     Right Foot Muscle Strength   Foot dorsiflexion:  4  Foot plantar flexion:  4  Foot inversion:  4  Foot eversion:  4     Left Foot Muscle Strength   Foot  dorsiflexion:  4  Foot plantar flexion:  4  Foot inversion:  4  Foot eversion:  4    RANGE OF MOTION     Right Foot Range of Motion   Foot and ankle ROM within normal limits       Left Foot Range of Motion   Foot and ankle ROM within normal limits      DERMATOLOGIC      Right Foot Dermatologic   Skin  Right foot skin is intact.      Left Foot Dermatologic   Skin  Left foot skin is intact.      Right foot additional comments: 3 lesions to plantar right foot with pain on lateral compression and pinpoint bleeding lesions measuring 0.3 x 0.3 cm lesions.        RADIOLOGY:          No results found.    ASSESSMENT/PLAN     Diagnoses and all orders for this visit:    1. Plantar warts (Primary)    2. Right foot pain            Verruca lesion areas were debrided with #15 scalpel blade down to pinpoint bleeding.  These areas are treated with applications of 89% phenol.  This was followed by irrigation with copious amounts of isopropyl alcohol.    Comprehensive lower extremity examination and evaluation was performed.    Discussed findings and treatment plan including risks, benefits, and treatment options with patient in detail. Patient agreed with treatment plan.    Medications and allergies reviewed.  Reviewed available lab values along with other pertinent labs.  These were discussed with the patient.    An After Visit Summary was printed and given to the patient at discharge, including (if requested) any available informative/educational handouts regarding diagnosis, treatment, or medications. All questions were answered to patient/family satisfaction. Should symptoms fail to improve or worsen they agree to call or return to clinic or to go to the Emergency Department. Discussed the importance of following up with any needed screening tests/labs/specialist appointments and any requested follow-up recommended by me today. Importance of maintaining follow-up discussed and patient accepts that missed appointments can delay  diagnosis and potentially lead to worsening of conditions.    Return in about 6 months (around 1/19/2025)., or sooner if acute issues arise.    This document has been electronically signed by Shar Puentes DPM on July 22, 2024 12:42 EDT

## 2025-03-03 ENCOUNTER — OFFICE VISIT (OUTPATIENT)
Dept: OTOLARYNGOLOGY | Facility: CLINIC | Age: 50
End: 2025-03-03
Payer: COMMERCIAL

## 2025-03-03 VITALS
SYSTOLIC BLOOD PRESSURE: 126 MMHG | HEART RATE: 59 BPM | DIASTOLIC BLOOD PRESSURE: 77 MMHG | OXYGEN SATURATION: 99 % | TEMPERATURE: 96.6 F

## 2025-03-03 DIAGNOSIS — J34.89 NASAL SORE: ICD-10-CM

## 2025-03-03 DIAGNOSIS — R09.81 NASAL CONGESTION: Primary | ICD-10-CM

## 2025-03-03 DIAGNOSIS — J34.3 HYPERTROPHY OF BOTH INFERIOR NASAL TURBINATES: ICD-10-CM

## 2025-03-03 RX ORDER — IPRATROPIUM BROMIDE 21 UG/1
2 SPRAY, METERED NASAL EVERY 12 HOURS
COMMUNITY

## 2025-03-03 RX ORDER — CELECOXIB 200 MG/1
CAPSULE ORAL
COMMUNITY
Start: 2025-02-18

## 2025-03-03 NOTE — PROGRESS NOTES
Patient Name: Castillo Barbour   Visit Date: 03/03/2025   Patient ID: [id number]  Provider: Hardik Kang MD    Sex: male  Location: [facility]   YOB: 1975  Location Address: [address] [place] [address], [address] [place] [address],?[place]?[id number]    Primary Care Provider Will Zimmerman MD  Location Phone: [phone]    Referring Provider: Will Zimmerman MD        Chief Complaint  Chronic sinusitis and Last seen 2020    History of Present Illness  Castillo Barbour is a 49 y.o. male who presents to [facility] today as a consult from Will Zimmerman MD for evaluation of his nose.  He has a previous history of nasal septoplasty and inferior turbinate reduction on 7/10/2020.  He tells me that he was doing well until around a year ago when he began experiencing right greater than left nasal congestion which seems to be worse at night when he lies supine.  He feels as though his clear watery rhinorrhea is relatively well-controlled using ipratropium bromide nasal spray once or twice daily.  He has been experiencing intermittent anterior nasal sores and pustules which she is sometimes able to express.  He also mentions experiencing some pain in the area of his right medial crura.     Past Medical History:   Diagnosis Date    DVT (deep venous thrombosis)     Enlarged prostate     Hypertension     Pulmonary emboli     Verruca        Past Surgical History:   Procedure Laterality Date    APPENDECTOMY      GASTRIC BANDING      SINUS SURGERY           Current Outpatient Medications:     celecoxib (CeleBREX) 200 MG capsule, , Disp: , Rfl:     cyclobenzaprine (FLEXERIL) 10 MG tablet, Take 1 tablet by mouth. (Patient taking differently: Take 1 tablet by mouth Daily As Needed.), Disp: , Rfl:     ipratropium (ATROVENT) 0.03 % nasal spray, Administer 2 sprays into the nostril(s) as directed by provider Every 12 (Twelve) Hours., Disp: , Rfl:     metoprolol succinate XL (TOPROL-XL)  25 MG 24 hr tablet, Take 1 tablet by mouth Daily., Disp: , Rfl:     tamsulosin (FLOMAX) 0.4 MG capsule 24 hr capsule, Take 1 capsule by mouth Daily., Disp: , Rfl:     Xarelto 15 MG tablet, , Disp: , Rfl:     zolpidem (AMBIEN) 10 MG tablet, Take 1 tablet by mouth., Disp: , Rfl:     mupirocin (BACTROBAN) 2 % nasal ointment, Administer 1 Application into the nostril(s) as directed by provider 2 (Two) Times a Day for 28 days. Apply to the nose twice daily, Disp: 56 g, Rfl: 1    naproxen (NAPROSYN) 500 MG tablet, naproxen 500 mg oral tablet take 1 tablet by oral route daily   Active, Disp: , Rfl:      Allergies   Allergen Reactions    Erythromycin Rash       Social History     Tobacco Use    Smoking status: Never    Smokeless tobacco: Never   Vaping Use    Vaping status: Never Used   Substance Use Topics    Alcohol use: Yes     Comment: occ    Drug use: Yes     Types: Marijuana     Comment: THC gummies        Objective     Vital Signs:   /77   Pulse 59   Temp 96.6 °F (35.9 °C)   SpO2 99%       Physical Exam    General: Well developed, well nourished patient of stated age in no acute distress. Voice is strong and clear.   Head: Normocephalic and atraumatic.  Face: No lesions.  Bilateral parotid and submandibular glands are unremarkable.  Stensen's and Warthin's ducts are productive of clear saliva bilaterally.  House-Brackmann I/VI     bilaterally.   muscles and temporomandibular joint nontender to palpation.  No TMJ crepitus.  Eyes: PERRLA, sclerae anicteric, no conjunctival injection. Extraocular movements are intact and full. No nystagmus.   Ears: Auricles are normal in appearance. Bilateral external auditory canals are unremarkable. Bilateral tympanic membranes are clear and without effusion. Hearing normal to conversational voice.   Nose: External nose is normal in appearance. Bilateral nares are patent with normal appearing mucosa. Septum mildly deviated to the right superiorly.  Inferior  turbinates are surgically reduced anteriorly. No lesions.   Oral Cavity: Lips are normal in appearance. Oral mucosa is unremarkable. Gingiva is unremarkable. Normal dentition for age. Tongue is unremarkable with good movement. Hard palate is unremarkable.   Oropharynx: Soft palate is unremarkable with full movement. Uvula is unremarkable. Bilateral tonsils are unremarkable. Posterior oropharynx is unremarkable.    Larynx and hypopharynx: Deferred secondary to gag reflex.  Neck: Supple.  No mass.  Nontender to palpation.  Trachea midline. Thyroid normal size and without nodules to palpation.   Lymphatic: No lymphadenopathy upon palpation.  Respiratory: Clear to auscultation bilaterally, nonlabored respirations    Cardiovascular: RRR, no murmurs, rubs, or gallops,   Psychiatric: Appropriate affect, cooperative   Neurologic: Oriented x 3, strength symmetric in all extremities, Cranial Nerves II-XII are grossly intact to confrontation   Skin: Warm and dry. No rashes.    Procedures   Diagnostic nasal endoscopy:    Indications: Bilateral nasal congestion in a patient with inability to visualize the middle meatus on anterior rhinoscopy.    Summary: Patient's bilateral nares were decongested and anesthetized with Afrin and lidocaine sprays respectively. After giving the medications ample time to take effect a 0° rigid endoscope was inserted in the bilateral nares revealing a midline nasal septum with a slight superior rightward deviation. The bilateral inferior turbinates are reduced anteriorly but significantly protruding posteriorly.  The middle turbinates were normal in appearance. The middle meati, olfactory clefts, and sphenoethmoidal recesses were without pus, polyps, or lesion bilaterally. The nasopharynx and bilateral eustachian tube orifices were without mass or lesion. The nasal mucosa was normal in appearance. The patient tolerated the procedure well.        Result Review :               Assessment and Plan     Diagnoses and all orders for this visit:    1. Nasal congestion (Primary)    2. Hypertrophy of both inferior nasal turbinates  -     Case Request; Standing  -     Case Request    3. Nasal sore  -     mupirocin (BACTROBAN) 2 % nasal ointment; Administer 1 Application into the nostril(s) as directed by provider 2 (Two) Times a Day for 28 days. Apply to the nose twice daily  Dispense: 56 g; Refill: 1    Other orders  -     Follow Anesthesia Guidelines / Protocol; Future  -     Follow Anesthesia Guidelines / Protocol; Standing  -     Verify NPO Status; Standing    Impressions and findings were discussed at great length.  Currently, he reports experiencing right greater than left nasal congestion which is most bothersome when lying supine and affects his CPAP use.  Examination today reveals a mild right superior septal deviation but significant bilateral inferior turbinate hypertrophy in the mid and posterior portions of the turbinates.  He has also noticed recurrent nasal sores which have been quite bothersome.  We discussed the pathophysiology and natural history of his conditions.  Options for management of the nasal sores were discussed and he will be placed on a course of mupirocin.  In regards to his nasal congestion we discussed the possibility of a trial of fluticasone versus revision inferior turbinate reduction.  We reviewed the risks, benefits, alternatives, and expected postoperative course.  After thorough discussion he elected to pursue revision of the inferior turbinate reduction.  He was given ample time to ask questions, all of which were answered to his satisfaction.        Follow Up   No follow-ups on file.  Patient was given instructions and counseling regarding his condition or for health maintenance advice. Please see specific information pulled into the AVS if appropriate.

## 2025-03-05 ENCOUNTER — PATIENT ROUNDING (BHMG ONLY) (OUTPATIENT)
Dept: OTOLARYNGOLOGY | Facility: CLINIC | Age: 50
End: 2025-03-05
Payer: COMMERCIAL

## 2025-03-05 NOTE — PROGRESS NOTES
A [employer] message has been send to the patient for PATIENT ROUNDING with [facility].

## 2025-04-11 ENCOUNTER — OFFICE VISIT (OUTPATIENT)
Dept: SLEEP MEDICINE | Facility: HOSPITAL | Age: 50
End: 2025-04-11
Payer: COMMERCIAL

## 2025-04-11 VITALS
SYSTOLIC BLOOD PRESSURE: 109 MMHG | WEIGHT: 229.4 LBS | DIASTOLIC BLOOD PRESSURE: 73 MMHG | BODY MASS INDEX: 28.52 KG/M2 | OXYGEN SATURATION: 99 % | HEART RATE: 60 BPM | HEIGHT: 75 IN

## 2025-04-11 DIAGNOSIS — G47.33 OSA ON CPAP: Primary | ICD-10-CM

## 2025-04-11 PROCEDURE — G0463 HOSPITAL OUTPT CLINIC VISIT: HCPCS

## 2025-04-11 RX ORDER — MUPIROCIN 20 MG/G
OINTMENT TOPICAL
COMMUNITY
Start: 2025-03-03

## 2025-04-11 NOTE — PROGRESS NOTES
[facility]                          Chief Complaint:   F/up ELVI    History of present illness:   Subjective       This is a 48 year old male patient, with history of HTN and PE.       ELVI:   Original symptoms consist of snoring, apnea, restless sleep and EDS.  Most of his symptoms improved with PAP therapy.     HST 2016: ARCHIE= 10/h; Carlos SpO2= 87%.    Patient has been on CPAP therapy since then. He received a new CPAP 2024.    +Dry mouth despite increasing he humidity to the extent of water condensing in the mask and using a chinstrap.          DME: [facility].  Mask: NP      Sleep schedule:  -Bedtime: 10:30  -Wake up time: 6:15 AM , does feel refreshed  -Nocturnal awakenin-3 times because of change position.  No difficulties going back to sleep.      ESS: Total score: 2     HTN: On metoprolol.  BP is controlled.  He takes Xarelto for history of DVT.    REVIEW OF SYSTEMS:   HEENT: Postnasal drip.  Dry mouth.  CARDIOVASCULAR: No chest pain, chest pressure or chest discomfort. No palpitations or edema.   RESPIRATORY: No shortness of breath, cough or sputum.   GASTROINTESTINAL: No abdominal bloating or reflux   NEUROLOGICAL/PSYCHIATRY: No depression nor anxiety    Past Medical History:  Past Medical History:   Diagnosis Date    DVT (deep venous thrombosis)     Enlarged prostate     Hypertension     Pulmonary emboli     Verruca    ,   Past Surgical History:   Procedure Laterality Date    APPENDECTOMY      GASTRIC BANDING      SINUS SURGERY     ,   Social History     Socioeconomic History    Marital status:    Tobacco Use    Smoking status: Never    Smokeless tobacco: Never   Vaping Use    Vaping status: Never Used   Substance and Sexual Activity    Alcohol use: Yes     Comment: occ    Drug use: Yes     Types: Marijuana     Comment: THC gummies    Sexual activity: Defer     E-cigarette/Vaping    E-cigarette/Vaping Use Never User      E-cigarette/Vaping Substances    Nicotine  "No     THC No     CBD No     Flavoring No      E-cigarette/Vaping Devices    Disposable No     Pre-filled or Refillable Cartridge No     Refillable Tank No     Pre-filled Pod No          , and Allergies:  Erythromycin    Medication Review:     Current Outpatient Medications:     celecoxib (CeleBREX) 200 MG capsule, , Disp: , Rfl:     cyclobenzaprine (FLEXERIL) 10 MG tablet, Take 1 tablet by mouth. (Patient taking differently: Take 1 tablet by mouth Daily As Needed.), Disp: , Rfl:     ipratropium (ATROVENT) 0.03 % nasal spray, Administer 2 sprays into the nostril(s) as directed by provider Every 12 (Twelve) Hours., Disp: , Rfl:     metoprolol succinate XL (TOPROL-XL) 25 MG 24 hr tablet, Take 1 tablet by mouth Daily., Disp: , Rfl:     mupirocin (BACTROBAN) 2 % ointment, , Disp: , Rfl:     naproxen (NAPROSYN) 500 MG tablet, naproxen 500 mg oral tablet take 1 tablet by oral route daily   Active, Disp: , Rfl:     tamsulosin (FLOMAX) 0.4 MG capsule 24 hr capsule, Take 1 capsule by mouth Daily., Disp: , Rfl:     Xarelto 15 MG tablet, , Disp: , Rfl:     zolpidem (AMBIEN) 10 MG tablet, Take 1 tablet by mouth., Disp: , Rfl:       Objective   Vital Signs:  Vitals:    04/11/25 1500   BP: 109/73   Pulse: 60   SpO2: 99%   Weight: 104 kg (229 lb 6.4 oz)   Height: 190.5 cm (75\")     Body mass index is 28.67 kg/m².          Physical Exam:   General Appearance:    Alert, cooperative, in no acute distress   ENMT:  Trevino score 3. Mallampati score 3. No oral thrush. Tonsils grade 1. Narrow distance in between the posterior pharyngeal pillars.  Scalloped tongue.    Neck:   Trachea midline. No thyromegaly.   Lungs:     Clear to auscultation,respirations regular, even and  unlabored    Heart:    Regular rhythm and normal rate, normal S1 and S2, no Murmur.   Abdomen:     Obese.  Soft.  No tenderness.  No HSM    Neuro:   Conscious, alert, oriented x3. Appropriate mood and affect.    Extremities:   Moves all extremities well, no edema, no " "cyanosis, no Redness              Diagnostic data:      CPAP download showed:  Date: Last 30 days  Usage (days): 93%  Days used>4h: 87 %  AHI: 1.1/h  Leak: 17  Usage: 6h and 26 min  P 95% : 8.8  Auto CPAP: 5-15 cm H2O    No results found for: \"HGBA1C\"  No results found for: \"CHOL\", \"TRIG\", \"HDL\"  Hemoglobin   Date Value Ref Range Status   06/16/2021 14.5 13.5 - 17.5 g/dL Final     Total CO2   Date Value Ref Range Status   06/16/2021 23 22 - 29 mmol/L Final        Assessment   ELVI  HTN  Overweight, BMI 28        PLAN:      Discussed the result of the download.   Patient is compliant with therapy and clinically benefit from treatment.  Patient was encouraged to continue using PAP.  Refill supplies.    Refit with a new mask due to air leak.     Continue Metoprolol. Discussed the association between obstructive sleep apnea and cardiovascular disease including HTN and the beneficial effect of Pap therapy in reducing the risk of major cardiovascular events.    Counseled for weight loss.    RTC 1 year.               This note was dictated utilizing Dragon dictation  "

## 2025-07-14 ENCOUNTER — ANESTHESIA EVENT (OUTPATIENT)
Dept: PERIOP | Facility: HOSPITAL | Age: 50
End: 2025-07-14
Payer: COMMERCIAL

## 2025-07-15 ENCOUNTER — HOSPITAL ENCOUNTER (OUTPATIENT)
Facility: HOSPITAL | Age: 50
Setting detail: HOSPITAL OUTPATIENT SURGERY
Discharge: HOME | End: 2025-07-15
Attending: OTOLARYNGOLOGY | Admitting: OTOLARYNGOLOGY
Payer: COMMERCIAL

## 2025-07-15 ENCOUNTER — ANESTHESIA (OUTPATIENT)
Dept: PERIOP | Facility: HOSPITAL | Age: 50
End: 2025-07-15
Payer: COMMERCIAL

## 2025-07-15 VITALS
BODY MASS INDEX: 28.12 KG/M2 | RESPIRATION RATE: 12 BRPM | WEIGHT: 226.19 LBS | OXYGEN SATURATION: 99 % | DIASTOLIC BLOOD PRESSURE: 74 MMHG | HEIGHT: 75 IN | SYSTOLIC BLOOD PRESSURE: 139 MMHG | HEART RATE: 74 BPM | TEMPERATURE: 97 F

## 2025-07-15 DIAGNOSIS — J34.3 HYPERTROPHY OF BOTH INFERIOR NASAL TURBINATES: Primary | ICD-10-CM

## 2025-07-15 PROCEDURE — 25010000002 PROPOFOL 10 MG/ML EMULSION: Performed by: NURSE ANESTHETIST, CERTIFIED REGISTERED

## 2025-07-15 PROCEDURE — 25010000002 SUGAMMADEX 200 MG/2ML SOLUTION: Performed by: NURSE ANESTHETIST, CERTIFIED REGISTERED

## 2025-07-15 PROCEDURE — 25810000003 LACTATED RINGERS PER 1000 ML: Performed by: ANESTHESIOLOGY

## 2025-07-15 PROCEDURE — 25010000002 ONDANSETRON PER 1 MG: Performed by: NURSE ANESTHETIST, CERTIFIED REGISTERED

## 2025-07-15 PROCEDURE — 30140 RESECT INFERIOR TURBINATE: CPT | Performed by: OTOLARYNGOLOGY

## 2025-07-15 PROCEDURE — 25010000002 LIDOCAINE PF 2% 2 % SOLUTION: Performed by: NURSE ANESTHETIST, CERTIFIED REGISTERED

## 2025-07-15 PROCEDURE — 25010000002 FENTANYL CITRATE (PF) 50 MCG/ML SOLUTION: Performed by: NURSE ANESTHETIST, CERTIFIED REGISTERED

## 2025-07-15 PROCEDURE — 25010000002 MIDAZOLAM PER 1MG: Performed by: ANESTHESIOLOGY

## 2025-07-15 PROCEDURE — 25010000002 LIDOCAINE 1% - EPINEPHRINE 1:100000 1 %-1:100000 SOLUTION: Performed by: OTOLARYNGOLOGY

## 2025-07-15 PROCEDURE — 25010000002 DEXAMETHASONE PER 1 MG: Performed by: NURSE ANESTHETIST, CERTIFIED REGISTERED

## 2025-07-15 PROCEDURE — 25010000002 EPINEPHRINE (ANAPHYLAXIS) 1 MG/ML SOLUTION: Performed by: OTOLARYNGOLOGY

## 2025-07-15 RX ORDER — LIDOCAINE HYDROCHLORIDE 20 MG/ML
INJECTION, SOLUTION EPIDURAL; INFILTRATION; INTRACAUDAL; PERINEURAL AS NEEDED
Status: DISCONTINUED | OUTPATIENT
Start: 2025-07-15 | End: 2025-07-15 | Stop reason: SURG

## 2025-07-15 RX ORDER — ONDANSETRON 2 MG/ML
4 INJECTION INTRAMUSCULAR; INTRAVENOUS ONCE AS NEEDED
Status: DISCONTINUED | OUTPATIENT
Start: 2025-07-15 | End: 2025-07-15 | Stop reason: HOSPADM

## 2025-07-15 RX ORDER — OXYMETAZOLINE HYDROCHLORIDE 0.05 G/100ML
SPRAY NASAL AS NEEDED
Status: DISCONTINUED | OUTPATIENT
Start: 2025-07-15 | End: 2025-07-15 | Stop reason: HOSPADM

## 2025-07-15 RX ORDER — EPINEPHRINE 1 MG/ML
INJECTION, SOLUTION INTRAMUSCULAR; SUBCUTANEOUS AS NEEDED
Status: DISCONTINUED | OUTPATIENT
Start: 2025-07-15 | End: 2025-07-15 | Stop reason: HOSPADM

## 2025-07-15 RX ORDER — HYDROCODONE BITARTRATE AND ACETAMINOPHEN 5; 325 MG/1; MG/1
1 TABLET ORAL EVERY 4 HOURS PRN
Qty: 15 TABLET | Refills: 0 | Status: SHIPPED | OUTPATIENT
Start: 2025-07-15

## 2025-07-15 RX ORDER — PROPOFOL 10 MG/ML
VIAL (ML) INTRAVENOUS AS NEEDED
Status: DISCONTINUED | OUTPATIENT
Start: 2025-07-15 | End: 2025-07-15 | Stop reason: SURG

## 2025-07-15 RX ORDER — MIDAZOLAM HYDROCHLORIDE 2 MG/2ML
2 INJECTION, SOLUTION INTRAMUSCULAR; INTRAVENOUS ONCE
Status: COMPLETED | OUTPATIENT
Start: 2025-07-15 | End: 2025-07-15

## 2025-07-15 RX ORDER — SODIUM CHLORIDE, SODIUM LACTATE, POTASSIUM CHLORIDE, CALCIUM CHLORIDE 600; 310; 30; 20 MG/100ML; MG/100ML; MG/100ML; MG/100ML
9 INJECTION, SOLUTION INTRAVENOUS CONTINUOUS PRN
Status: DISCONTINUED | OUTPATIENT
Start: 2025-07-15 | End: 2025-07-15 | Stop reason: HOSPADM

## 2025-07-15 RX ORDER — ONDANSETRON 2 MG/ML
INJECTION INTRAMUSCULAR; INTRAVENOUS AS NEEDED
Status: DISCONTINUED | OUTPATIENT
Start: 2025-07-15 | End: 2025-07-15 | Stop reason: SURG

## 2025-07-15 RX ORDER — PROMETHAZINE HYDROCHLORIDE 25 MG/1
25 SUPPOSITORY RECTAL ONCE AS NEEDED
Status: DISCONTINUED | OUTPATIENT
Start: 2025-07-15 | End: 2025-07-15 | Stop reason: HOSPADM

## 2025-07-15 RX ORDER — DEXAMETHASONE SODIUM PHOSPHATE 4 MG/ML
INJECTION, SOLUTION INTRA-ARTICULAR; INTRALESIONAL; INTRAMUSCULAR; INTRAVENOUS; SOFT TISSUE AS NEEDED
Status: DISCONTINUED | OUTPATIENT
Start: 2025-07-15 | End: 2025-07-15 | Stop reason: SURG

## 2025-07-15 RX ORDER — PROMETHAZINE HYDROCHLORIDE 12.5 MG/1
25 TABLET ORAL ONCE AS NEEDED
Status: DISCONTINUED | OUTPATIENT
Start: 2025-07-15 | End: 2025-07-15 | Stop reason: HOSPADM

## 2025-07-15 RX ORDER — FENTANYL CITRATE 50 UG/ML
INJECTION, SOLUTION INTRAMUSCULAR; INTRAVENOUS AS NEEDED
Status: DISCONTINUED | OUTPATIENT
Start: 2025-07-15 | End: 2025-07-15 | Stop reason: SURG

## 2025-07-15 RX ORDER — LIDOCAINE HYDROCHLORIDE AND EPINEPHRINE 10; 10 MG/ML; UG/ML
INJECTION, SOLUTION INFILTRATION; PERINEURAL AS NEEDED
Status: DISCONTINUED | OUTPATIENT
Start: 2025-07-15 | End: 2025-07-15 | Stop reason: HOSPADM

## 2025-07-15 RX ORDER — ACETAMINOPHEN 500 MG
1000 TABLET ORAL ONCE
Status: COMPLETED | OUTPATIENT
Start: 2025-07-15 | End: 2025-07-15

## 2025-07-15 RX ORDER — MAGNESIUM HYDROXIDE 1200 MG/15ML
LIQUID ORAL AS NEEDED
Status: DISCONTINUED | OUTPATIENT
Start: 2025-07-15 | End: 2025-07-15 | Stop reason: HOSPADM

## 2025-07-15 RX ORDER — OXYCODONE HYDROCHLORIDE 5 MG/1
5 TABLET ORAL
Status: DISCONTINUED | OUTPATIENT
Start: 2025-07-15 | End: 2025-07-15 | Stop reason: HOSPADM

## 2025-07-15 RX ORDER — ROCURONIUM BROMIDE 10 MG/ML
INJECTION, SOLUTION INTRAVENOUS AS NEEDED
Status: DISCONTINUED | OUTPATIENT
Start: 2025-07-15 | End: 2025-07-15 | Stop reason: SURG

## 2025-07-15 RX ADMIN — ROCURONIUM BROMIDE 50 MG: 10 INJECTION, SOLUTION INTRAVENOUS at 10:35

## 2025-07-15 RX ADMIN — PROPOFOL 200 MG: 10 INJECTION, EMULSION INTRAVENOUS at 10:34

## 2025-07-15 RX ADMIN — DEXAMETHASONE SODIUM PHOSPHATE 4 MG: 4 INJECTION, SOLUTION INTRAMUSCULAR; INTRAVENOUS at 10:46

## 2025-07-15 RX ADMIN — LIDOCAINE HYDROCHLORIDE 50 MG: 20 INJECTION, SOLUTION EPIDURAL; INFILTRATION; INTRACAUDAL; PERINEURAL at 10:33

## 2025-07-15 RX ADMIN — ONDANSETRON 4 MG: 2 INJECTION INTRAMUSCULAR; INTRAVENOUS at 11:24

## 2025-07-15 RX ADMIN — ACETAMINOPHEN 1000 MG: 500 TABLET ORAL at 09:17

## 2025-07-15 RX ADMIN — MIDAZOLAM HYDROCHLORIDE 2 MG: 1 INJECTION, SOLUTION INTRAMUSCULAR; INTRAVENOUS at 10:09

## 2025-07-15 RX ADMIN — FENTANYL CITRATE 50 MCG: 50 INJECTION, SOLUTION INTRAMUSCULAR; INTRAVENOUS at 10:33

## 2025-07-15 RX ADMIN — SUGAMMADEX 200 MG: 100 INJECTION, SOLUTION INTRAVENOUS at 11:32

## 2025-07-15 RX ADMIN — SODIUM CHLORIDE, POTASSIUM CHLORIDE, SODIUM LACTATE AND CALCIUM CHLORIDE 9 ML/HR: 600; 310; 30; 20 INJECTION, SOLUTION INTRAVENOUS at 09:17

## 2025-07-15 NOTE — H&P
"[facility] [place] AND [place]YSICAL    Patient Name: Castillo Barbour  : 1975  MRN: [id number]  Primary Care Physician:  Will Zimmerman MD  Date of admission: 7/15/2025    Subjective   Subjective     Chief Complaint: \"I am ready\"    HPI:    Castillo Barbour is a 49 y.o. male who presents today to undergo submucous resection of the inferior nasal turbinate secondary to nasal congestion and inferior turbinate hypertrophy.  He was last seen on 3/3/2025 please see that note for further details.  He tells me that his congestion has been stable but he is no longer experiencing any nasal sores after using the mupirocin.    Review of Systems   The following systems were reviewed and negative;  constitution, eyes, ENT, respiratory, cardiovascular, gastrointestinal, genitourinary, integument, hematologic / lymphatic, musculoskeletal, neurological, behavioral/psych, endocrine, and allergies / immunologic.    Personal History     Past Medical History:   Diagnosis Date    DVT (deep venous thrombosis)     AFTER LONG PLANE FLIGHT    Enlarged prostate     Hypertension     Pulmonary emboli     Verruca        Past Surgical History:   Procedure Laterality Date    APPENDECTOMY      GASTRIC BANDING      SINUS SURGERY         Family History: family history is not on file. Otherwise pertinent FHx was reviewed and not pertinent to current issue.    Social History:  reports that he has never smoked. He has never used smokeless tobacco. He reports current alcohol use. He reports current drug use. Drug: Marijuana.    Home Medications:  celecoxib, cyclobenzaprine, ipratropium, metoprolol succinate XL, mupirocin, naproxen, rivaroxaban, tamsulosin, and zolpidem      Allergies:  Allergies   Allergen Reactions    Erythromycin Rash       Objective   Objective     Vitals:   Temp:  [97.9 °F (36.6 °C)] 97.9 °F (36.6 °C)  Heart Rate:  [52] 52  Resp:  [13] 13  BP: (118)/(82) 118/82  Physical Exam   General: Well developed, well nourished patient of " stated age in no acute distress. Voice is strong and clear.   Head: Normocephalic and atraumatic.   Face: No lesions. House-Brackmann I/VI bilaterally.    Respiratory: Clear to auscultation bilaterally, nonlabored respirations    Cardiovascular: RRR, no murmurs, rubs, or gallops, palpable pedal pulses bilaterally   Psychiatric: Appropriate affect, cooperative     Result Review    Result Review:  I have personally reviewed the results from the time of this admission to 7/15/2025 09:17 EDT and agree with these findings:  []  Laboratory  []  Microbiology  []  Radiology  []  EKG/Telemetry   []  Cardiology/Vascular   []  Pathology  []  Old records  []  Other    Assessment & Plan   Assessment / Plan     Brief Patient Summary:  Castillo Barbour is a 49 y.o. male who presents today to undergo submucous resection of the inferior nasal turbinate secondary to nasal congestion and inferior turbinate hypertrophy.  We reviewed the risks, benefits, alternatives, and expected postoperative course.  After thorough discussion he elected to proceed with surgery.    Active Hospital Problems:  Active Hospital Problems    Diagnosis     **Hypertrophy of both inferior nasal turbinates        Plan: Proceed with surgery.      CODE STATUS:         Electronically signed by Hardik Kang MD, 07/15/25, 9:17 AM EDT.

## 2025-07-15 NOTE — ANESTHESIA POSTPROCEDURE EVALUATION
Patient: Castillo Barbour    Procedure Summary       Date: 07/15/25 Room / Location: [facility] OR  / [facility]    Anesthesia Start: 1030 Anesthesia Stop: 1144    Procedure: SUBMUCOSAL RESECTION INFERIOR TURBINATES Diagnosis:       Hypertrophy of both inferior nasal turbinates      (Hypertrophy of both inferior nasal turbinates [J34.3])    Surgeons: Hardik Kang MD Provider: Chi Garsia MD    Anesthesia Type: general ASA Status: 2            Anesthesia Type: general    Vitals  Vitals Value Taken Time   /81 07/15/25 12:00   Temp 36.1 °C (97 °F) 07/15/25 11:44   Pulse 62 07/15/25 12:08   Resp 12 07/15/25 11:44   SpO2 98 % 07/15/25 12:08   Vitals shown include unfiled device data.        Post Anesthesia Care and Evaluation    Patient location during evaluation: bedside  Patient participation: complete - patient participated  Level of consciousness: awake    Airway patency: patent  PONV Status: none  Cardiovascular status: acceptable  Respiratory status: acceptable  Hydration status: acceptable

## 2025-07-15 NOTE — ANESTHESIA PREPROCEDURE EVALUATION
Anesthesia Evaluation     Patient summary reviewed and Nursing notes reviewed                Airway   Mallampati: I  TM distance: >3 FB  Neck ROM: full  No difficulty expected  Dental      Pulmonary - negative pulmonary ROS and normal exam    breath sounds clear to auscultation  Cardiovascular - normal exam    Rhythm: regular  Rate: normal    (+) hypertension      Neuro/Psych- negative ROS  GI/Hepatic/Renal/Endo - negative ROS     Musculoskeletal (-) negative ROS    Abdominal    Substance History - negative use     OB/GYN negative ob/gyn ROS         Other                    Anesthesia Plan    ASA 2     general     intravenous induction     Anesthetic plan, risks, benefits, and alternatives have been provided, discussed and informed consent has been obtained with: patient.    CODE STATUS:

## 2025-07-15 NOTE — OP NOTE
SUBMUCOSAL RESECTION INFERIOR TURBINATES  Procedure Report    Patient Name:  Castillo Barbour  YOB: 1975    Date of Surgery:  7/15/2025     Indications:      Pre-op Diagnosis:   Hypertrophy of both inferior nasal turbinates [J34.3]       Post-Op Diagnosis Codes:     * Hypertrophy of both inferior nasal turbinates [J34.3]    Procedure/CPT® Codes:  No CPT Code Applied in Case Entry    Procedure(s):  1.  Submucosal resection of the bilateral inferior nasal turbinates.    Staff:  Surgeon(s):  Hardik Kang MD         Anesthesia: Choice    Estimated Blood Loss: 50 mL    Implants:    Nothing was implanted during the procedure    Specimen:          None        Findings:   1.  Hypertrophied inferior nasal turbinates with signs of previous reduction anteriorly.    Complications:   None.    Description of Procedure:   The patient was brought back to the operating room and placed supine upon the table. General endotracheal anesthesia was successfully established and the patient's midface was prepped and draped in the usual manner for submucous resection of the inferior nasal turbinates. The inferior turbinates were infiltrated with 4 mL of 1% lidocaine with 1:100,000 epinephrine. Afrin-soaked pledgets were inserted bilaterally. After giving the medications ample time to take effect the Afrin pledgets were removed and an incision was created over the anterior head of the left inferior turbinate. A Caudal elevator was used to raise the submucosal tissue from the turbinate bone. A microdebrider was then used to submucosally resect the turbinate. The left inferior turbinate was then outfractured. A similar procedure was then repeated on the patient's right inferior turbinate. The bilateral nasal cavities were irrigated profusely with sterile saline.  Hemostasis was obtained with epinephrine soaked pledgets.  An orogastric tube was inserted and used to evacuate the patient's stomach contents. The patient was  then returned to the care of anesthesia. The patient tolerated the procedure well and was transferred to the recovery room in satisfactory condition without apparent complication.                Hardik Kang MD     Date: 7/15/2025  Time: 11:54 EDT

## 2025-07-22 NOTE — PROGRESS NOTES
"Answers submitted by the patient for this visit:  Post Operative Visit (Submitted on 7/21/2025)  Chief Complaint: Follow-up  Pain Control: no pain  Fever: no fever  Diet: adequate intake  Activity: normal  Operative Site Issues: No  Patient Name: Castillo Barbour   Visit Date: 07/23/2025   Patient ID: [id number]  Provider: DENY Salmon    Sex: male  [place] [facility]   YOB: 1975  Location Address: [address] [place] [address], [address] [place] [address]?[id number]    Primary Care Provider Will Zimmerman MD  Location Phone: [phone]    Referring Provider: DENY Salmon        Chief Complaint  Post-op (1 WEEK ) and SUBMUCOSAL RESECTION INFERIOR TURBINATES    History of Present Illness  Castillo Barbour is a 49 y.o. male who presents to [facility] for Post-op (1 WEEK ) and SUBMUCOSAL RESECTION INFERIOR TURBINATES  Patient last seen in office by Dr. Kang on 3/3/2025.  Patient has a history of nasal septoplasty and turbinate reduction in 2020.  About a year ago he started having right greater than left nasal congestion that seemed worse at night when he laid supine.  Was also suffering from clear watery rhinorrhea that was relatively controlled with ipratropium bromide nasal spray twice a day.  Patient was placed on bacitracin daily for nasal sores.  7/15/2025: SUBMUCOSAL RESECTION INFERIOR TURBINATES per Dr. Kang.  History of Present Illness  The patient presents for evaluation status post turbinate reduction.    He underwent a turbinate reduction procedure performed by Dr. Kang. Post-procedure, he reports no pain but continues to experience significant nasal drip, which he hopes will decrease over time. He has a history of septoplasty.        Vital Signs:  Vitals:    07/23/25 1548   BP: 120/76   Pulse: 62   Temp: 97.5 °F (36.4 °C)   Weight: 103 kg (227 lb 3.2 oz)   Height: 190.5 cm (75\")        Past Medical History:   Diagnosis Date    DVT (deep " venous thrombosis)     AFTER LONG PLANE FLIGHT    Enlarged prostate     Hypertension     Pulmonary emboli     Sleep apnea     Verruca        Past Surgical History:   Procedure Laterality Date    APPENDECTOMY      GASTRIC BANDING      SINUS SURGERY           Current Outpatient Medications:     celecoxib (CeleBREX) 200 MG capsule, , Disp: , Rfl:     cyclobenzaprine (FLEXERIL) 10 MG tablet, Take 1 tablet by mouth. (Patient taking differently: Take 1 tablet by mouth Daily As Needed.), Disp: , Rfl:     ipratropium (ATROVENT) 0.06 % nasal spray, INSTILL 2 SPRAYS INTO EACH NOSTRIL EVERY 6 HOURS AS NEEDED, Disp: , Rfl:     metoprolol succinate XL (TOPROL-XL) 25 MG 24 hr tablet, Take 1 tablet by mouth Daily., Disp: , Rfl:     tamsulosin (FLOMAX) 0.4 MG capsule 24 hr capsule, Take 1 capsule by mouth Daily., Disp: , Rfl:     Xarelto 15 MG tablet, , Disp: , Rfl:     zolpidem (AMBIEN) 10 MG tablet, Take 1 tablet by mouth., Disp: , Rfl:     HYDROcodone-acetaminophen (NORCO) 5-325 MG per tablet, Take 1 tablet by mouth Every 4 (Four) Hours As Needed for Moderate Pain or Severe Pain. (Patient not taking: Reported on 7/23/2025), Disp: 15 tablet, Rfl: 0     Allergies   Allergen Reactions    Erythromycin Rash       Social History     Tobacco Use    Smoking status: Never     Passive exposure: Never    Smokeless tobacco: Never   Vaping Use    Vaping status: Never Used   Substance Use Topics    Alcohol use: Yes     Alcohol/week: 6.0 standard drinks of alcohol     Types: 6 Shots of liquor per week     Comment: occ    Drug use: Yes     Frequency: 6.0 times per week     Types: Marijuana     Comment: THC gummies        Objective     Tobacco Use: Low Risk  (7/23/2025)    Patient History     Smoking Tobacco Use: Never     Smokeless Tobacco Use: Never     Passive Exposure: Never         Physical Exam    Constitutional   Appearance  well developed, well-nourished, alert and in no acute distress, voice clear and strong    Head   Inspection  no  deformities or lesions, atraumatic    Face   Inspection  no facial lesions; House-Brackmann I/VI bilaterally   Palpation  no TMJ crepitus nor  muscle tenderness bilaterally     Eyes/Vision   Visual Fields  extraocular movements are intact, no spontaneous or gaze-induced nystagmus  Conjunctivae  clear   Sclerae  clear   Pupils and Irises  pupils equal, round, and reactive to light.   Nystagmus  not present     Ears, Nose, Mouth and Throat  Ears  External Ears  Auricles appearance within normal limits, no lesions present   Otoscopic Examination  tympanic membrane appearance within normal limits bilaterally without perforations, well-aerated middle ears without evidence of effusion  Hearing  intact to conversational voice both ears   Tunning fork testing    Rinne:  Wong:    Nose  External Nose  appearance normal   Intranasal Exam  mucosa within normal limits with bilateral inferior turbinates consistent with recent turbinate reduction with mild scabbing but are healing nicely and paranasal sinuses are wide open, vestibules normal, no intranasal lesions present, septum midline, sinuses non tender to percussion   Modified Kane Test:    Oral Cavity  Oral Mucosa  oral mucosa normal without pallor or cyanosis   Stensen's and Warthin's ducts are productive and patent with clear saliva  Lips  lip appearance normal   Teeth  normal dentition for age   Gums  gums pink, non-swollen, no bleeding present   Tongue  tongue appearance normal; normal mobility   Palate  hard palate normal, soft palate appearance normal with symmetric mobility     Throat  Oropharynx  no inflammation or lesions present  Tonsils  Bilateral tonsils unremarkable  Hypopharynx  appearance within normal limits   Larynx  voice normal     Neck  Inspection/Palpation  normal appearance, no masses or tenderness, trachea midline; thyroid size normal, nontender, no nodules or masses present on palpation     Lymphatic  Neck  no lymphadenopathy present  "  Supraclavicular Nodes  no lymphadenopathy present   Preauricular Nodes  no lymphadenopathy present     Respiratory  Respiratory Effort  breathing unlabored   Inspection of Chest  normal appearance, no retractions     Musculoskeletal   Cervical back: Normal range of motion and neck supple.      Skin and Subcutaneous Tissue  General Inspection  Regarding face and neck - there are no rashes present, no lesions present, and no areas of discoloration     Neurologic  Cranial Nerves  Alert and oriented x3  cranial nerves II-XII are grossly intact bilaterally   Gait and Station  normal gait, able to stand without diffculty    Psychiatric  Judgement and Insight  judgment and insight intact   Mood and Affect  mood normal, affect appropriate       RESULTS REVIEWED    I have reviewed the following information:   [x]  Previous Internal Note  []  Previous External Note:   [x]  Ordered Tests & Results:      Pathology: No results found for: \"MICRO\"    Calcium   Date Value Ref Range Status   06/16/2021 9.2 8.4 - 10.2 mg/dL Final       No Images in the past 120 days found..      I have discussed the interpretation of the above results with the patient.    Procedures          Assessment and Plan   Diagnoses and all orders for this visit:    1. Nasal congestion (Primary)    2. Hypertrophy of both inferior nasal turbinates    3. Nasal sore        Assessment & Plan  1. Status post turbinate reduction.  Status post turbinate reduction with no reported pain. Examination shows healing is progressing well with some scabs and redness present. Advised to maintain nasal moisture using saline spray to aid in the healing process. A 1-month postoperative follow-up appointment has been scheduled. If sudden onset of pain or pus discharge occurs, contact the office immediately for potential antibiotic treatment.      (R09.81) Nasal congestion    (J34.3) Hypertrophy of both inferior nasal turbinates    (J34.89) Nasal sore     Castillo Barbour  reports " that he has never smoked. He has never been exposed to tobacco smoke. He has never used smokeless tobacco.     Plan:  Patient Instructions   Neilmed Saline Nasal Rinse  http://www.neilmed.com/usa/directions-for-use.php        Step 1  Please wash your hands. Fill the clean bottle with the designated volume of either distilled, micro-filtered (through 0.2 micron filter), commercially bottled or previously boiled and cooled down water. Always rinse your nasal passages with NeilMed® Sinus Rinse™ packets only. Our packets contain a mixture of USP grade sodium chloride and sodium bicarbonate. These ingredients are of the purest quality available to make the dry powder mixture. Rinsing your nasal passages with only plain water without our mixture will result in a severe burning sensation as the plain water is not physiologic for your nasal lining, even if it is appropriate for drinking. Additionally, for your safety, do not use tap or faucet water for dissolving the mixture unless it has been previously boiled for five minutes or more as boiling sterilizes the water. Other choices are distilled, micro-filtered (through 0.2 micron), commercially bottled or, as mentioned earlier, previously boiled water at lukewarm or body temperature. You can store boiled water in a clean container for seven days or more if refrigerated. Do not use non-chlorinated or non-ultra (0.2 micron) filtered well water unless it is boiled and then cooled to lukewarm or body temperature.  *You may warm the water in a microwave in increments of 5 to 10 seconds to avoid overheating the water, damaging the device or scalding your nasal passage.   Step 2  Cut the Sinus Rinse™ mixture packet at the corner and pour its contents into the bottle. Tighten the cap and tube on the bottle securely. Place one finger over the tip of the cap and shake the bottle gently to dissolve the mixture.   Step 3  Standing in front of a sink, bend forward to your comfort level  and tilt your head down. Keeping your mouth open, without holding your breath, place the cap snugly against your nasal passage. SQUEEZE BOTTLE GENTLY until the solution starts draining from the OPPOSITE nasal passage. Some may drain from your mouth. For a proper rinse, keep squeezing the bottle GENTLY until at least 1/4 to 1/2 (60 mL to 120 mL or 2 to 4 fl oz) of the bottle is used. Do not swallow the solution.   Step 4  Blow your nose very gently, without pinching nose completely to avoid pressure on eardrums. If tolerable, sniff in gently any residual solution remaining in the nasal passage once or twice, because this may clean out the posterior nasopharyngeal area, which is the area at the back of your nasal passage. At times, some solution will reach the back of your throat, so please spit it out. To help drain any residual solution, blow your nose gently while tilting your head forward and to the opposite side of the nasal passage you just rinsed.  Step 5  Now repeat steps 3 and 4 for your other nasal passage. Most users fi nd that rinsing twice a day is beneficial, similar to brushing your teeth. Many doctors recommend rinsing 3-4 times daily or for special circumstances, even rinsing up to 6 times a day is safe. Please follow your physician’s advice.        Follow Up   Return in about 3 weeks (around 8/13/2025), or 1 month post op.  Patient was given instructions and counseling regarding his condition or for health maintenance advice. Please see specific information pulled into the AVS if appropriate.      Patient or patient representative verbalized consent for the use of Ambient Listening during the visit with  DENY Salmon for chart documentation. 7/23/2025  17:05 EDT    All or a portion of this Note was dictated utilizing Dragon Dictation.

## 2025-07-23 ENCOUNTER — OFFICE VISIT (OUTPATIENT)
Dept: OTOLARYNGOLOGY | Facility: CLINIC | Age: 50
End: 2025-07-23
Payer: COMMERCIAL

## 2025-07-23 VITALS
TEMPERATURE: 97.5 F | SYSTOLIC BLOOD PRESSURE: 120 MMHG | HEART RATE: 62 BPM | HEIGHT: 75 IN | DIASTOLIC BLOOD PRESSURE: 76 MMHG | WEIGHT: 227.2 LBS | BODY MASS INDEX: 28.25 KG/M2

## 2025-07-23 DIAGNOSIS — J34.89 NASAL SORE: ICD-10-CM

## 2025-07-23 DIAGNOSIS — J34.3 HYPERTROPHY OF BOTH INFERIOR NASAL TURBINATES: ICD-10-CM

## 2025-07-23 DIAGNOSIS — R09.81 NASAL CONGESTION: Primary | ICD-10-CM

## 2025-07-23 RX ORDER — IPRATROPIUM BROMIDE 42 UG/1
SPRAY, METERED NASAL
COMMUNITY
Start: 2025-06-17

## 2025-07-23 NOTE — PATIENT INSTRUCTIONS
Neilmed Saline Nasal Rinse  http://www.[employer]/usa/directions-for-use.php        Step 1  Please wash your hands. Fill the clean bottle with the designated volume of either distilled, micro-filtered (through 0.2 micron filter), commercially bottled or previously boiled and cooled down water. Always rinse your nasal passages with NeilMed® Sinus Rinse™ packets only. Our packets contain a mixture of USP grade sodium chloride and sodium bicarbonate. These ingredients are of the purest quality available to make the dry powder mixture. Rinsing your nasal passages with only plain water without our mixture will result in a severe burning sensation as the plain water is not physiologic for your nasal lining, even if it is appropriate for drinking. Additionally, for your safety, do not use tap or faucet water for dissolving the mixture unless it has been previously boiled for five minutes or more as boiling sterilizes the water. Other choices are distilled, micro-filtered (through 0.2 micron), commercially bottled or, as mentioned earlier, previously boiled water at lukewarm or body temperature. You can store boiled water in a clean container for seven days or more if refrigerated. Do not use non-chlorinated or non-ultra (0.2 micron) filtered well water unless it is boiled and then cooled to lukewarm or body temperature.  *You may warm the water in a microwave in increments of 5 to 10 seconds to avoid overheating the water, damaging the device or scalding your nasal passage.   Step 2  Cut the Sinus Rinse™ mixture packet at the corner and pour its contents into the bottle. Tighten the cap and tube on the bottle securely. Place one finger over the tip of the cap and shake the bottle gently to dissolve the mixture.   Step 3  Standing in front of a sink, bend forward to your comfort level and tilt your head down. Keeping your mouth open, without holding your breath, place the cap snugly against your nasal passage. SQUEEZE  BOTTLE GENTLY until the solution starts draining from the OPPOSITE nasal passage. Some may drain from your mouth. For a proper rinse, keep squeezing the bottle GENTLY until at least 1/4 to 1/2 (60 mL to 120 mL or 2 to 4 fl oz) of the bottle is used. Do not swallow the solution.   Step 4  Blow your nose very gently, without pinching nose completely to avoid pressure on eardrums. If tolerable, sniff in gently any residual solution remaining in the nasal passage once or twice, because this may clean out the posterior nasopharyngeal area, which is the area at the back of your nasal passage. At times, some solution will reach the back of your throat, so please spit it out. To help drain any residual solution, blow your nose gently while tilting your head forward and to the opposite side of the nasal passage you just rinsed.  Step 5  Now repeat steps 3 and 4 for your other nasal passage. Most users fi nd that rinsing twice a day is beneficial, similar to brushing your teeth. Many doctors recommend rinsing 3-4 times daily or for special circumstances, even rinsing up to 6 times a day is safe. Please follow your physician’s advice.

## 2025-08-21 ENCOUNTER — OFFICE VISIT (OUTPATIENT)
Dept: OTOLARYNGOLOGY | Facility: CLINIC | Age: 50
End: 2025-08-21
Payer: COMMERCIAL

## 2025-08-21 VITALS
SYSTOLIC BLOOD PRESSURE: 125 MMHG | WEIGHT: 227.6 LBS | HEIGHT: 75 IN | TEMPERATURE: 97.5 F | BODY MASS INDEX: 28.3 KG/M2 | HEART RATE: 55 BPM | DIASTOLIC BLOOD PRESSURE: 81 MMHG

## 2025-08-21 DIAGNOSIS — J34.3 HYPERTROPHY OF BOTH INFERIOR NASAL TURBINATES: ICD-10-CM

## 2025-08-21 DIAGNOSIS — R09.81 NASAL CONGESTION: Primary | ICD-10-CM

## 2025-08-21 DIAGNOSIS — J30.0 VASOMOTOR RHINITIS: ICD-10-CM

## (undated) DEVICE — Device

## (undated) DEVICE — MEDICINE CUP, GRADUATED, STER: Brand: MEDLINE

## (undated) DEVICE — BLADE 1882040HR 5PK M4 INF TURB 2MM ROT: Brand: STRAIGHTSHOT

## (undated) DEVICE — SLV SCD KN/LEN ADJ EXPRSS BLENDED MD 1P/U

## (undated) DEVICE — THE STERILE LIGHT HANDLE COVER IS USED WITH STERIS SURGICAL LIGHTING AND VISUALIZATION SYSTEMS.

## (undated) DEVICE — SPONGE,NEURO,0.5"X3",XR,STRL,LF,10/PK: Brand: MEDLINE

## (undated) DEVICE — TUBING, SUCTION, 1/4" X 10', STRAIGHT: Brand: MEDLINE

## (undated) DEVICE — ENT-LF: Brand: MEDLINE INDUSTRIES, INC.

## (undated) DEVICE — DUAL LUMEN STOMACH TUBE: Brand: SALEM SUMP

## (undated) DEVICE — KIT,ANTI FOG,W/SPONGE & FLUID,SOFT PACK: Brand: MEDLINE

## (undated) DEVICE — STERILE POLYISOPRENE POWDER-FREE SURGICAL GLOVES: Brand: PROTEXIS